# Patient Record
Sex: MALE | Race: WHITE | Employment: FULL TIME | URBAN - NONMETROPOLITAN AREA
[De-identification: names, ages, dates, MRNs, and addresses within clinical notes are randomized per-mention and may not be internally consistent; named-entity substitution may affect disease eponyms.]

---

## 2017-11-09 ENCOUNTER — OFFICE VISIT (OUTPATIENT)
Dept: URGENT CARE | Age: 32
End: 2017-11-09
Payer: COMMERCIAL

## 2017-11-09 VITALS
RESPIRATION RATE: 20 BRPM | HEART RATE: 92 BPM | HEIGHT: 70 IN | OXYGEN SATURATION: 95 % | BODY MASS INDEX: 34.79 KG/M2 | TEMPERATURE: 98.9 F | DIASTOLIC BLOOD PRESSURE: 90 MMHG | WEIGHT: 243 LBS | SYSTOLIC BLOOD PRESSURE: 147 MMHG

## 2017-11-09 DIAGNOSIS — J02.9 SORE THROAT: ICD-10-CM

## 2017-11-09 DIAGNOSIS — J03.90 TONSILLITIS: Primary | ICD-10-CM

## 2017-11-09 DIAGNOSIS — M54.2 ACUTE NECK PAIN: ICD-10-CM

## 2017-11-09 LAB — S PYO AG THROAT QL: NORMAL

## 2017-11-09 PROCEDURE — G8427 DOCREV CUR MEDS BY ELIG CLIN: HCPCS | Performed by: NURSE PRACTITIONER

## 2017-11-09 PROCEDURE — 1036F TOBACCO NON-USER: CPT | Performed by: NURSE PRACTITIONER

## 2017-11-09 PROCEDURE — G8484 FLU IMMUNIZE NO ADMIN: HCPCS | Performed by: NURSE PRACTITIONER

## 2017-11-09 PROCEDURE — 87880 STREP A ASSAY W/OPTIC: CPT | Performed by: NURSE PRACTITIONER

## 2017-11-09 PROCEDURE — 99213 OFFICE O/P EST LOW 20 MIN: CPT | Performed by: NURSE PRACTITIONER

## 2017-11-09 PROCEDURE — G8417 CALC BMI ABV UP PARAM F/U: HCPCS | Performed by: NURSE PRACTITIONER

## 2017-11-09 RX ORDER — METHYLPREDNISOLONE 4 MG/1
TABLET ORAL
Qty: 1 KIT | Refills: 0 | Status: SHIPPED | OUTPATIENT
Start: 2017-11-09 | End: 2017-11-15

## 2017-11-09 RX ORDER — AMOXICILLIN AND CLAVULANATE POTASSIUM 875; 125 MG/1; MG/1
1 TABLET, FILM COATED ORAL EVERY 12 HOURS
Qty: 20 TABLET | Refills: 0 | Status: SHIPPED | OUTPATIENT
Start: 2017-11-09 | End: 2017-11-19

## 2017-11-09 ASSESSMENT — ENCOUNTER SYMPTOMS: SORE THROAT: 1

## 2017-11-09 NOTE — PATIENT INSTRUCTIONS
Patient Education        Tonsillitis: Care Instructions  Your Care Instructions    Tonsillitis is an infection of the tonsils that is caused by bacteria or a virus. The tonsils are in the back of the throat and are part of the immune system. Tonsillitis typically lasts from a few days up to a couple of weeks. Tonsillitis caused by a virus goes away on its own. Tonsillitis caused by the bacteria that causes strep throat is treated with antibiotics. You and your doctor may consider surgery to remove the tonsils (tonsillectomy) if you have serious complications or repeat infections. Follow-up care is a key part of your treatment and safety. Be sure to make and go to all appointments, and call your doctor if you are having problems. It's also a good idea to know your test results and keep a list of the medicines you take. How can you care for yourself at home? · If your doctor prescribed antibiotics, take them as directed. Do not stop taking them just because you feel better. You need to take the full course of antibiotics. · Gargle with warm salt water. This helps reduce swelling and relieve discomfort. Gargle once an hour with 1 teaspoon of salt mixed in 8 fluid ounces of warm water. · Take an over-the-counter pain medicine, such as acetaminophen (Tylenol), ibuprofen (Advil, Motrin), or naproxen (Aleve). Be safe with medicines. Read and follow all instructions on the label. No one younger than 20 should take aspirin. It has been linked to Reye syndrome, a serious illness. · Be careful when taking over-the-counter cold or flu medicines and Tylenol at the same time. Many of these medicines have acetaminophen, which is Tylenol. Read the labels to make sure that you are not taking more than the recommended dose. Too much acetaminophen (Tylenol) can be harmful. · Try an over-the-counter throat spray to relieve throat pain. · Drink plenty of fluids. Fluids may help soothe an irritated throat.  Drink warm or cool

## 2017-11-09 NOTE — PROGRESS NOTES
[address] [facility]  [address] [facility] [place] [address]  Dept: [phone]  Loc: [phone]      Lennie Rees is c/o of Headache; Pharyngitis; Generalized Body Aches; and Anorexia        HPI:     HPI  Patient presents with Headache; Pharyngitis; Generalized Body Aches; and decreased appetite. He did have fever last night of 102. Symptoms started two days ago. He has taken ibuprofen and tylenol. Past Medical History:   Diagnosis Date    Tourette disease       Past Surgical History:   Procedure Laterality Date    CHOLECYSTECTOMY      HERNIA REPAIR      LYMPHADENECTOMY         History reviewed. No pertinent family history. Social History   Substance Use Topics    Smoking status: Never Smoker    Smokeless tobacco: Never Used    Alcohol use 0.0 oz/week      Comment: occasionally      Current Outpatient Prescriptions   Medication Sig Dispense Refill    amoxicillin-clavulanate (AUGMENTIN) 875-125 MG per tablet Take 1 tablet by mouth every 12 hours for 10 days 20 tablet 0    methylPREDNISolone (MEDROL DOSEPACK) 4 MG tablet Take by mouth. 1 kit 0    benzocaine (CEPACOL) 10 MG LOZG Take 1 lozenge by mouth as needed (sore throat) 30 lozenge 0     No current facility-administered medications for this visit. No Known Allergies    Health Maintenance   Topic Date Due    HIV screen  07/12/1999    DTaP/Tdap/Td vaccine (1 - Tdap) 07/12/2003    Flu vaccine (1) 09/01/2017       Subjective:      Review of Systems   Constitutional: Positive for appetite change, chills and fever. HENT: Positive for sore throat. Neurological: Positive for headaches. All other systems reviewed and are negative. Objective:     Physical Exam   Constitutional: He is oriented to person, place, and time. He appears well-developed and well-nourished. No distress. HENT:   Head: Normocephalic and atraumatic.    Right Ear: External ear normal.   Left Ear: External ear normal.   Mouth/Throat: Posterior oropharyngeal erythema present. Right tonsillar swelling and erythema. Eyes: Conjunctivae and EOM are normal. Pupils are equal, round, and reactive to light. Right eye exhibits no discharge. Left eye exhibits no discharge. No scleral icterus. Neck: Normal range of motion. Neck supple. No JVD present. No thyromegaly present. Cardiovascular: Normal rate, regular rhythm and normal heart sounds. No murmur heard. Pulmonary/Chest: Effort normal and breath sounds normal. No respiratory distress. Abdominal: Soft. Bowel sounds are normal. He exhibits no distension. There is no tenderness. Musculoskeletal: Normal range of motion. Neurological: He is alert and oriented to person, place, and time. No cranial nerve deficit. Skin: Skin is warm and dry. No rash noted. He is not diaphoretic. Psychiatric: He has a normal mood and affect. His behavior is normal. Judgment normal.   Nursing note and vitals reviewed. BP (!) 147/90 (Site: Left Arm, Position: Sitting, Cuff Size: Small Adult)   Pulse 92   Temp 98.9 °F (37.2 °C) (Oral)   Resp 20   Ht 5' 10\" (1.778 m)   Wt 243 lb (110.2 kg)   SpO2 95%   BMI 34.87 kg/m²     Results for orders placed or performed in visit on 11/09/17   POCT rapid strep A   Result Value Ref Range    Strep A Ag None Detected None Detected       Assessment/ Plan      1. Tonsillitis  amoxicillin-clavulanate (AUGMENTIN) 875-125 MG per tablet    methylPREDNISolone (MEDROL DOSEPACK) 4 MG tablet    benzocaine (CEPACOL) 10 MG LOZG   2. Sore throat  POCT rapid strep A   3. Acute neck pain  methylPREDNISolone (MEDROL DOSEPACK) 4 MG tablet           Patient given educational materials - see patient instructions. Discussed use, benefit, and side effects of prescribed medications. All patient questions answered. Pt voiced understanding. Patient agreed with treatment plan.  Follow up as needed    Patient Instructions     Patient Education

## 2018-06-13 ENCOUNTER — OFFICE VISIT (OUTPATIENT)
Dept: SURGERY | Age: 33
End: 2018-06-13
Payer: COMMERCIAL

## 2018-06-13 VITALS
WEIGHT: 247 LBS | TEMPERATURE: 97.8 F | HEIGHT: 71 IN | DIASTOLIC BLOOD PRESSURE: 78 MMHG | SYSTOLIC BLOOD PRESSURE: 140 MMHG | BODY MASS INDEX: 34.58 KG/M2

## 2018-06-13 DIAGNOSIS — K42.9 RECURRENT UMBILICAL HERNIA: Primary | ICD-10-CM

## 2018-06-13 PROCEDURE — 1036F TOBACCO NON-USER: CPT | Performed by: PHYSICIAN ASSISTANT

## 2018-06-13 PROCEDURE — G8427 DOCREV CUR MEDS BY ELIG CLIN: HCPCS | Performed by: PHYSICIAN ASSISTANT

## 2018-06-13 PROCEDURE — 99203 OFFICE O/P NEW LOW 30 MIN: CPT | Performed by: PHYSICIAN ASSISTANT

## 2018-06-13 PROCEDURE — G8417 CALC BMI ABV UP PARAM F/U: HCPCS | Performed by: PHYSICIAN ASSISTANT

## 2018-06-13 RX ORDER — AMLODIPINE BESYLATE 5 MG/1
TABLET ORAL
Refills: 0 | COMMUNITY
Start: 2018-06-11

## 2018-06-13 RX ORDER — DICLOFENAC SODIUM 75 MG/1
TABLET, DELAYED RELEASE ORAL
Refills: 2 | COMMUNITY
Start: 2018-05-20

## 2018-06-15 ENCOUNTER — HOSPITAL ENCOUNTER (OUTPATIENT)
Dept: PREADMISSION TESTING | Age: 33
Discharge: HOME | End: 2018-06-19
Payer: COMMERCIAL

## 2018-06-15 VITALS — WEIGHT: 247 LBS | HEIGHT: 71 IN | BODY MASS INDEX: 34.58 KG/M2

## 2018-06-15 LAB
ANION GAP SERPL CALCULATED.3IONS-SCNC: 17 MMOL/L (ref 7–19)
BASOPHILS ABSOLUTE: 0 K/UL (ref 0–0.2)
BASOPHILS RELATIVE PERCENT: 0.4 % (ref 0–1)
BUN BLDV-MCNC: 12 MG/DL (ref 6–20)
CALCIUM SERPL-MCNC: 9.6 MG/DL (ref 8.6–10)
CHLORIDE BLD-SCNC: 99 MMOL/L (ref 98–111)
CO2: 26 MMOL/L (ref 22–29)
CREAT SERPL-MCNC: 0.9 MG/DL (ref 0.5–1.2)
EOSINOPHILS ABSOLUTE: 0.1 K/UL (ref 0–0.6)
EOSINOPHILS RELATIVE PERCENT: 1.5 % (ref 0–5)
GFR NON-AFRICAN AMERICAN: >60
GLUCOSE BLD-MCNC: 132 MG/DL (ref 74–109)
HCT VFR BLD CALC: 43.8 % (ref 42–52)
HEMOGLOBIN: 15.2 G/DL (ref 14–18)
LYMPHOCYTES ABSOLUTE: 3.1 K/UL (ref 1.1–4.5)
LYMPHOCYTES RELATIVE PERCENT: 45.9 % (ref 20–40)
MCH RBC QN AUTO: 29.7 PG (ref 27–31)
MCHC RBC AUTO-ENTMCNC: 34.7 G/DL (ref 33–37)
MCV RBC AUTO: 85.7 FL (ref 80–94)
MONOCYTES ABSOLUTE: 0.6 K/UL (ref 0–0.9)
MONOCYTES RELATIVE PERCENT: 8.5 % (ref 0–10)
NEUTROPHILS ABSOLUTE: 2.9 K/UL (ref 1.5–7.5)
NEUTROPHILS RELATIVE PERCENT: 43.6 % (ref 50–65)
PDW BLD-RTO: 13 % (ref 11.5–14.5)
PLATELET # BLD: 267 K/UL (ref 130–400)
PMV BLD AUTO: 10.3 FL (ref 9.4–12.4)
POTASSIUM SERPL-SCNC: 3.5 MMOL/L (ref 3.5–5)
RBC # BLD: 5.11 M/UL (ref 4.7–6.1)
SODIUM BLD-SCNC: 142 MMOL/L (ref 136–145)
WBC # BLD: 6.7 K/UL (ref 4.8–10.8)

## 2018-06-15 PROCEDURE — 80048 BASIC METABOLIC PNL TOTAL CA: CPT

## 2018-06-15 PROCEDURE — 85025 COMPLETE CBC W/AUTO DIFF WBC: CPT

## 2018-06-15 PROCEDURE — 87070 CULTURE OTHR SPECIMN AEROBIC: CPT

## 2018-06-15 PROCEDURE — 93005 ELECTROCARDIOGRAM TRACING: CPT

## 2018-06-16 LAB — MRSA CULTURE ONLY: NORMAL

## 2018-06-18 LAB
EKG P AXIS: 15 DEGREES
EKG P-R INTERVAL: 162 MS
EKG Q-T INTERVAL: 358 MS
EKG QRS DURATION: 94 MS
EKG QTC CALCULATION (BAZETT): 401 MS
EKG T AXIS: 6 DEGREES

## 2018-06-19 ENCOUNTER — PREP FOR PROCEDURE (OUTPATIENT)
Dept: SURGERY | Age: 33
End: 2018-06-19

## 2018-06-19 RX ORDER — SODIUM CHLORIDE, SODIUM LACTATE, POTASSIUM CHLORIDE, CALCIUM CHLORIDE 600; 310; 30; 20 MG/100ML; MG/100ML; MG/100ML; MG/100ML
INJECTION, SOLUTION INTRAVENOUS CONTINUOUS
Status: CANCELLED | OUTPATIENT
Start: 2018-06-19

## 2018-06-19 RX ORDER — SODIUM CHLORIDE 0.9 % (FLUSH) 0.9 %
10 SYRINGE (ML) INJECTION EVERY 12 HOURS SCHEDULED
Status: CANCELLED | OUTPATIENT
Start: 2018-06-19

## 2018-06-19 RX ORDER — SODIUM CHLORIDE 0.9 % (FLUSH) 0.9 %
10 SYRINGE (ML) INJECTION PRN
Status: CANCELLED | OUTPATIENT
Start: 2018-06-19

## 2018-06-20 ENCOUNTER — ANESTHESIA (OUTPATIENT)
Dept: OPERATING ROOM | Age: 33
End: 2018-06-20
Payer: COMMERCIAL

## 2018-06-20 ENCOUNTER — ANESTHESIA EVENT (OUTPATIENT)
Dept: OPERATING ROOM | Age: 33
End: 2018-06-20
Payer: COMMERCIAL

## 2018-06-20 ENCOUNTER — HOSPITAL ENCOUNTER (OUTPATIENT)
Age: 33
Setting detail: OUTPATIENT SURGERY
Discharge: HOME | End: 2018-06-20
Attending: SURGERY | Admitting: SURGERY
Payer: COMMERCIAL

## 2018-06-20 VITALS
HEIGHT: 71 IN | OXYGEN SATURATION: 94 % | HEART RATE: 89 BPM | WEIGHT: 247 LBS | BODY MASS INDEX: 34.58 KG/M2 | TEMPERATURE: 98 F | SYSTOLIC BLOOD PRESSURE: 148 MMHG | RESPIRATION RATE: 20 BRPM | DIASTOLIC BLOOD PRESSURE: 96 MMHG

## 2018-06-20 VITALS
TEMPERATURE: 98 F | RESPIRATION RATE: 11 BRPM | DIASTOLIC BLOOD PRESSURE: 73 MMHG | SYSTOLIC BLOOD PRESSURE: 113 MMHG | OXYGEN SATURATION: 90 %

## 2018-06-20 DIAGNOSIS — K42.9 RECURRENT UMBILICAL HERNIA: Primary | ICD-10-CM

## 2018-06-20 PROCEDURE — 7100000000 HC PACU RECOVERY - FIRST 15 MIN: Performed by: SURGERY

## 2018-06-20 PROCEDURE — C1781 MESH (IMPLANTABLE): HCPCS | Performed by: SURGERY

## 2018-06-20 PROCEDURE — 6370000000 HC RX 637 (ALT 250 FOR IP): Performed by: SURGERY

## 2018-06-20 PROCEDURE — 2580000003 HC RX 258: Performed by: ANESTHESIOLOGY

## 2018-06-20 PROCEDURE — 6360000002 HC RX W HCPCS: Performed by: NURSE ANESTHETIST, CERTIFIED REGISTERED

## 2018-06-20 PROCEDURE — 7100000011 HC PHASE II RECOVERY - ADDTL 15 MIN: Performed by: SURGERY

## 2018-06-20 PROCEDURE — 49585 REPAIR UMBILICAL HERN,5+Y/O,REDUC: CPT | Performed by: SURGERY

## 2018-06-20 PROCEDURE — 2500000003 HC RX 250 WO HCPCS: Performed by: NURSE ANESTHETIST, CERTIFIED REGISTERED

## 2018-06-20 PROCEDURE — 3600000014 HC SURGERY LEVEL 4 ADDTL 15MIN: Performed by: SURGERY

## 2018-06-20 PROCEDURE — 2580000003 HC RX 258: Performed by: SURGERY

## 2018-06-20 PROCEDURE — 6360000002 HC RX W HCPCS: Performed by: SURGERY

## 2018-06-20 PROCEDURE — 7100000001 HC PACU RECOVERY - ADDTL 15 MIN: Performed by: SURGERY

## 2018-06-20 PROCEDURE — 3600000004 HC SURGERY LEVEL 4 BASE: Performed by: SURGERY

## 2018-06-20 PROCEDURE — 2500000003 HC RX 250 WO HCPCS: Performed by: SURGERY

## 2018-06-20 PROCEDURE — 3700000001 HC ADD 15 MINUTES (ANESTHESIA): Performed by: SURGERY

## 2018-06-20 PROCEDURE — 7100000010 HC PHASE II RECOVERY - FIRST 15 MIN: Performed by: SURGERY

## 2018-06-20 PROCEDURE — 3700000000 HC ANESTHESIA ATTENDED CARE: Performed by: SURGERY

## 2018-06-20 PROCEDURE — 6360000002 HC RX W HCPCS: Performed by: ANESTHESIOLOGY

## 2018-06-20 DEVICE — MESH SURG W4.3XL4.3CM CIR VENTRAL PTCH FOR TISS SEPARATING: Type: IMPLANTABLE DEVICE | Site: UMBILICAL | Status: FUNCTIONAL

## 2018-06-20 RX ORDER — HYDROMORPHONE HCL IN 0.9% NACL 0.5 MG/ML
0.5 SYRINGE (ML) INTRAVENOUS EVERY 5 MIN PRN
Status: DISCONTINUED | OUTPATIENT
Start: 2018-06-20 | End: 2018-06-20 | Stop reason: HOSPADM

## 2018-06-20 RX ORDER — MIDAZOLAM HYDROCHLORIDE 1 MG/ML
2 INJECTION INTRAMUSCULAR; INTRAVENOUS
Status: COMPLETED | OUTPATIENT
Start: 2018-06-20 | End: 2018-06-20

## 2018-06-20 RX ORDER — HYDRALAZINE HYDROCHLORIDE 20 MG/ML
5 INJECTION INTRAMUSCULAR; INTRAVENOUS EVERY 10 MIN PRN
Status: DISCONTINUED | OUTPATIENT
Start: 2018-06-20 | End: 2018-06-20 | Stop reason: HOSPADM

## 2018-06-20 RX ORDER — LIDOCAINE HYDROCHLORIDE 10 MG/ML
1 INJECTION, SOLUTION EPIDURAL; INFILTRATION; INTRACAUDAL; PERINEURAL
Status: DISCONTINUED | OUTPATIENT
Start: 2018-06-20 | End: 2018-06-20 | Stop reason: HOSPADM

## 2018-06-20 RX ORDER — FENTANYL CITRATE 50 UG/ML
INJECTION, SOLUTION INTRAMUSCULAR; INTRAVENOUS PRN
Status: DISCONTINUED | OUTPATIENT
Start: 2018-06-20 | End: 2018-06-20 | Stop reason: SDUPTHER

## 2018-06-20 RX ORDER — DIPHENHYDRAMINE HYDROCHLORIDE 50 MG/ML
12.5 INJECTION INTRAMUSCULAR; INTRAVENOUS
Status: DISCONTINUED | OUTPATIENT
Start: 2018-06-20 | End: 2018-06-20 | Stop reason: HOSPADM

## 2018-06-20 RX ORDER — KETOROLAC TROMETHAMINE 30 MG/ML
INJECTION, SOLUTION INTRAMUSCULAR; INTRAVENOUS PRN
Status: DISCONTINUED | OUTPATIENT
Start: 2018-06-20 | End: 2018-06-20 | Stop reason: SDUPTHER

## 2018-06-20 RX ORDER — ROCURONIUM BROMIDE 10 MG/ML
INJECTION, SOLUTION INTRAVENOUS PRN
Status: DISCONTINUED | OUTPATIENT
Start: 2018-06-20 | End: 2018-06-20 | Stop reason: SDUPTHER

## 2018-06-20 RX ORDER — LIDOCAINE HYDROCHLORIDE 10 MG/ML
INJECTION, SOLUTION INFILTRATION; PERINEURAL PRN
Status: DISCONTINUED | OUTPATIENT
Start: 2018-06-20 | End: 2018-06-20 | Stop reason: SDUPTHER

## 2018-06-20 RX ORDER — SODIUM CHLORIDE 0.9 % (FLUSH) 0.9 %
10 SYRINGE (ML) INJECTION PRN
Status: DISCONTINUED | OUTPATIENT
Start: 2018-06-20 | End: 2018-06-20 | Stop reason: HOSPADM

## 2018-06-20 RX ORDER — METOCLOPRAMIDE HYDROCHLORIDE 5 MG/ML
10 INJECTION INTRAMUSCULAR; INTRAVENOUS
Status: COMPLETED | OUTPATIENT
Start: 2018-06-20 | End: 2018-06-20

## 2018-06-20 RX ORDER — ENALAPRILAT 2.5 MG/2ML
1.25 INJECTION INTRAVENOUS
Status: DISCONTINUED | OUTPATIENT
Start: 2018-06-20 | End: 2018-06-20 | Stop reason: HOSPADM

## 2018-06-20 RX ORDER — OXYCODONE HYDROCHLORIDE AND ACETAMINOPHEN 5; 325 MG/1; MG/1
1-2 TABLET ORAL EVERY 4 HOURS PRN
Qty: 20 TABLET | Refills: 0 | Status: SHIPPED | OUTPATIENT
Start: 2018-06-20 | End: 2018-07-05 | Stop reason: ALTCHOICE

## 2018-06-20 RX ORDER — MEPERIDINE HYDROCHLORIDE 50 MG/ML
12.5 INJECTION INTRAMUSCULAR; INTRAVENOUS; SUBCUTANEOUS EVERY 5 MIN PRN
Status: DISCONTINUED | OUTPATIENT
Start: 2018-06-20 | End: 2018-06-20 | Stop reason: HOSPADM

## 2018-06-20 RX ORDER — KETAMINE HYDROCHLORIDE 100 MG/ML
INJECTION, SOLUTION INTRAMUSCULAR; INTRAVENOUS PRN
Status: DISCONTINUED | OUTPATIENT
Start: 2018-06-20 | End: 2018-06-20 | Stop reason: SDUPTHER

## 2018-06-20 RX ORDER — SODIUM CHLORIDE 9 MG/ML
INJECTION, SOLUTION INTRAVENOUS CONTINUOUS
Status: DISCONTINUED | OUTPATIENT
Start: 2018-06-20 | End: 2018-06-20 | Stop reason: HOSPADM

## 2018-06-20 RX ORDER — SODIUM CHLORIDE 0.9 % (FLUSH) 0.9 %
10 SYRINGE (ML) INJECTION EVERY 12 HOURS SCHEDULED
Status: DISCONTINUED | OUTPATIENT
Start: 2018-06-20 | End: 2018-06-20 | Stop reason: HOSPADM

## 2018-06-20 RX ORDER — FENTANYL CITRATE 50 UG/ML
50 INJECTION, SOLUTION INTRAMUSCULAR; INTRAVENOUS
Status: DISCONTINUED | OUTPATIENT
Start: 2018-06-20 | End: 2018-06-20 | Stop reason: HOSPADM

## 2018-06-20 RX ORDER — PROMETHAZINE HYDROCHLORIDE 25 MG/ML
6.25 INJECTION, SOLUTION INTRAMUSCULAR; INTRAVENOUS
Status: COMPLETED | OUTPATIENT
Start: 2018-06-20 | End: 2018-06-20

## 2018-06-20 RX ORDER — FENTANYL CITRATE 50 UG/ML
25 INJECTION, SOLUTION INTRAMUSCULAR; INTRAVENOUS
Status: DISCONTINUED | OUTPATIENT
Start: 2018-06-20 | End: 2018-06-20 | Stop reason: HOSPADM

## 2018-06-20 RX ORDER — MORPHINE SULFATE 1 MG/ML
2 INJECTION, SOLUTION EPIDURAL; INTRATHECAL; INTRAVENOUS EVERY 5 MIN PRN
Status: DISCONTINUED | OUTPATIENT
Start: 2018-06-20 | End: 2018-06-20 | Stop reason: HOSPADM

## 2018-06-20 RX ORDER — ONDANSETRON 2 MG/ML
INJECTION INTRAMUSCULAR; INTRAVENOUS PRN
Status: DISCONTINUED | OUTPATIENT
Start: 2018-06-20 | End: 2018-06-20 | Stop reason: SDUPTHER

## 2018-06-20 RX ORDER — PROPOFOL 10 MG/ML
INJECTION, EMULSION INTRAVENOUS PRN
Status: DISCONTINUED | OUTPATIENT
Start: 2018-06-20 | End: 2018-06-20 | Stop reason: SDUPTHER

## 2018-06-20 RX ORDER — OXYCODONE HYDROCHLORIDE AND ACETAMINOPHEN 5; 325 MG/1; MG/1
1 TABLET ORAL PRN
Status: COMPLETED | OUTPATIENT
Start: 2018-06-20 | End: 2018-06-20

## 2018-06-20 RX ORDER — MORPHINE SULFATE 1 MG/ML
4 INJECTION, SOLUTION EPIDURAL; INTRATHECAL; INTRAVENOUS EVERY 5 MIN PRN
Status: DISCONTINUED | OUTPATIENT
Start: 2018-06-20 | End: 2018-06-20 | Stop reason: HOSPADM

## 2018-06-20 RX ORDER — SODIUM CHLORIDE, SODIUM LACTATE, POTASSIUM CHLORIDE, CALCIUM CHLORIDE 600; 310; 30; 20 MG/100ML; MG/100ML; MG/100ML; MG/100ML
INJECTION, SOLUTION INTRAVENOUS CONTINUOUS
Status: DISCONTINUED | OUTPATIENT
Start: 2018-06-20 | End: 2018-06-20 | Stop reason: HOSPADM

## 2018-06-20 RX ORDER — HYDROMORPHONE HCL IN 0.9% NACL 0.5 MG/ML
0.25 SYRINGE (ML) INTRAVENOUS EVERY 5 MIN PRN
Status: DISCONTINUED | OUTPATIENT
Start: 2018-06-20 | End: 2018-06-20 | Stop reason: HOSPADM

## 2018-06-20 RX ORDER — BUPIVACAINE HYDROCHLORIDE 2.5 MG/ML
INJECTION, SOLUTION INFILTRATION; PERINEURAL PRN
Status: DISCONTINUED | OUTPATIENT
Start: 2018-06-20 | End: 2018-06-20 | Stop reason: HOSPADM

## 2018-06-20 RX ORDER — LABETALOL HYDROCHLORIDE 5 MG/ML
5 INJECTION, SOLUTION INTRAVENOUS EVERY 10 MIN PRN
Status: DISCONTINUED | OUTPATIENT
Start: 2018-06-20 | End: 2018-06-20 | Stop reason: HOSPADM

## 2018-06-20 RX ORDER — OXYCODONE HYDROCHLORIDE AND ACETAMINOPHEN 5; 325 MG/1; MG/1
2 TABLET ORAL PRN
Status: COMPLETED | OUTPATIENT
Start: 2018-06-20 | End: 2018-06-20

## 2018-06-20 RX ADMIN — FENTANYL CITRATE 100 MCG: 50 INJECTION, SOLUTION INTRAMUSCULAR; INTRAVENOUS at 12:51

## 2018-06-20 RX ADMIN — METOCLOPRAMIDE 10 MG: 5 INJECTION, SOLUTION INTRAMUSCULAR; INTRAVENOUS at 15:18

## 2018-06-20 RX ADMIN — ONDANSETRON HYDROCHLORIDE 4 MG: 2 SOLUTION INTRAMUSCULAR; INTRAVENOUS at 13:20

## 2018-06-20 RX ADMIN — LIDOCAINE HYDROCHLORIDE 50 MG: 10 INJECTION, SOLUTION INFILTRATION; PERINEURAL at 12:51

## 2018-06-20 RX ADMIN — Medication 0.5 MG: at 13:50

## 2018-06-20 RX ADMIN — PROMETHAZINE HYDROCHLORIDE 6.25 MG: 25 INJECTION, SOLUTION INTRAMUSCULAR; INTRAVENOUS at 15:18

## 2018-06-20 RX ADMIN — SODIUM CHLORIDE, SODIUM LACTATE, POTASSIUM CHLORIDE, AND CALCIUM CHLORIDE: 600; 310; 30; 20 INJECTION, SOLUTION INTRAVENOUS at 12:47

## 2018-06-20 RX ADMIN — Medication 2 G: at 12:56

## 2018-06-20 RX ADMIN — OXYCODONE HYDROCHLORIDE AND ACETAMINOPHEN 2 TABLET: 5; 325 TABLET ORAL at 14:25

## 2018-06-20 RX ADMIN — SODIUM CHLORIDE, SODIUM LACTATE, POTASSIUM CHLORIDE, AND CALCIUM CHLORIDE: 600; 310; 30; 20 INJECTION, SOLUTION INTRAVENOUS at 13:06

## 2018-06-20 RX ADMIN — PROPOFOL 200 MG: 10 INJECTION, EMULSION INTRAVENOUS at 12:51

## 2018-06-20 RX ADMIN — MIDAZOLAM HYDROCHLORIDE 2 MG: 1 INJECTION, SOLUTION INTRAMUSCULAR; INTRAVENOUS at 12:47

## 2018-06-20 RX ADMIN — SODIUM CHLORIDE, SODIUM LACTATE, POTASSIUM CHLORIDE, AND CALCIUM CHLORIDE: 600; 310; 30; 20 INJECTION, SOLUTION INTRAVENOUS at 10:38

## 2018-06-20 RX ADMIN — SUGAMMADEX 240 MG: 100 INJECTION, SOLUTION INTRAVENOUS at 13:27

## 2018-06-20 RX ADMIN — KETOROLAC TROMETHAMINE 30 MG: 30 INJECTION, SOLUTION INTRAMUSCULAR at 13:21

## 2018-06-20 RX ADMIN — Medication 50 MG: at 12:51

## 2018-06-20 RX ADMIN — ROCURONIUM BROMIDE 50 MG: 10 INJECTION INTRAVENOUS at 12:51

## 2018-06-20 RX ADMIN — Medication 0.5 MG: at 14:07

## 2018-06-20 ASSESSMENT — PAIN DESCRIPTION - LOCATION
LOCATION: ABDOMEN
LOCATION: ABDOMEN

## 2018-06-20 ASSESSMENT — PAIN DESCRIPTION - FREQUENCY
FREQUENCY: INTERMITTENT
FREQUENCY: CONTINUOUS

## 2018-06-20 ASSESSMENT — PAIN DESCRIPTION - PROGRESSION
CLINICAL_PROGRESSION: NOT CHANGED
CLINICAL_PROGRESSION: NOT CHANGED

## 2018-06-20 ASSESSMENT — PAIN DESCRIPTION - PAIN TYPE
TYPE: SURGICAL PAIN
TYPE: SURGICAL PAIN

## 2018-06-20 ASSESSMENT — PAIN SCALES - GENERAL
PAINLEVEL_OUTOF10: 8
PAINLEVEL_OUTOF10: 6
PAINLEVEL_OUTOF10: 6
PAINLEVEL_OUTOF10: 4
PAINLEVEL_OUTOF10: 8

## 2018-06-20 ASSESSMENT — PAIN DESCRIPTION - ONSET
ONSET: AWAKENED FROM SLEEP
ONSET: AWAKENED FROM SLEEP

## 2018-06-20 ASSESSMENT — PAIN DESCRIPTION - DESCRIPTORS
DESCRIPTORS: ACHING
DESCRIPTORS: ACHING

## 2018-06-20 ASSESSMENT — PAIN - FUNCTIONAL ASSESSMENT: PAIN_FUNCTIONAL_ASSESSMENT: 0-10

## 2018-07-05 ENCOUNTER — OFFICE VISIT (OUTPATIENT)
Dept: SURGERY | Age: 33
End: 2018-07-05

## 2018-07-05 VITALS
SYSTOLIC BLOOD PRESSURE: 130 MMHG | HEIGHT: 70 IN | BODY MASS INDEX: 35.82 KG/M2 | TEMPERATURE: 97.9 F | WEIGHT: 250.2 LBS | DIASTOLIC BLOOD PRESSURE: 80 MMHG

## 2018-07-05 DIAGNOSIS — Z09 S/P UMBILICAL HERNIA REPAIR, FOLLOW-UP EXAM: Primary | ICD-10-CM

## 2018-07-05 PROCEDURE — 99024 POSTOP FOLLOW-UP VISIT: CPT | Performed by: SURGERY

## 2018-08-07 ENCOUNTER — OFFICE VISIT (OUTPATIENT)
Dept: SURGERY | Age: 33
End: 2018-08-07

## 2018-08-07 VITALS
SYSTOLIC BLOOD PRESSURE: 130 MMHG | TEMPERATURE: 98.1 F | DIASTOLIC BLOOD PRESSURE: 86 MMHG | HEIGHT: 70 IN | BODY MASS INDEX: 36.05 KG/M2 | WEIGHT: 251.8 LBS

## 2018-08-07 DIAGNOSIS — Z87.19 S/P HERNIA SURGERY: Primary | ICD-10-CM

## 2018-08-07 DIAGNOSIS — Z98.890 S/P HERNIA SURGERY: Primary | ICD-10-CM

## 2018-08-07 PROCEDURE — 99024 POSTOP FOLLOW-UP VISIT: CPT | Performed by: PHYSICIAN ASSISTANT

## 2018-10-31 RX ORDER — AMLODIPINE BESYLATE AND BENAZEPRIL HYDROCHLORIDE 5; 10 MG/1; MG/1
1 CAPSULE ORAL DAILY
COMMUNITY
End: 2022-04-15

## 2018-12-20 ENCOUNTER — OFFICE VISIT (OUTPATIENT)
Dept: NEUROLOGY | Facility: CLINIC | Age: 33
End: 2018-12-20

## 2018-12-20 VITALS
RESPIRATION RATE: 18 BRPM | DIASTOLIC BLOOD PRESSURE: 74 MMHG | WEIGHT: 251 LBS | SYSTOLIC BLOOD PRESSURE: 136 MMHG | BODY MASS INDEX: 34 KG/M2 | HEART RATE: 78 BPM | HEIGHT: 72 IN

## 2018-12-20 DIAGNOSIS — F95.2 TOURETTE'S SYNDROME: Primary | ICD-10-CM

## 2018-12-20 DIAGNOSIS — R41.3 MEMORY DIFFICULTY: ICD-10-CM

## 2018-12-20 PROCEDURE — 99204 OFFICE O/P NEW MOD 45 MIN: CPT | Performed by: PSYCHIATRY & NEUROLOGY

## 2018-12-20 RX ORDER — MULTIPLE VITAMINS W/ MINERALS TAB 9MG-400MCG
1 TAB ORAL
COMMUNITY
End: 2022-04-15
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
## 2018-12-27 ENCOUNTER — HOSPITAL ENCOUNTER (OUTPATIENT)
Dept: MRI IMAGING | Facility: HOSPITAL | Age: 33
Discharge: HOME | End: 2018-12-27
Attending: PSYCHIATRY & NEUROLOGY | Admitting: PSYCHIATRY & NEUROLOGY

## 2018-12-27 DIAGNOSIS — F95.2 TOURETTE'S SYNDROME: ICD-10-CM

## 2018-12-27 PROCEDURE — 70551 MRI BRAIN STEM W/O DYE: CPT

## 2019-01-03 ENCOUNTER — HOSPITAL ENCOUNTER (OUTPATIENT)
Dept: SPEECH THERAPY | Facility: HOSPITAL | Age: 34
Setting detail: THERAPIES SERIES
Discharge: HOME | End: 2019-01-03

## 2019-01-03 ENCOUNTER — LAB (OUTPATIENT)
Dept: LAB | Facility: HOSPITAL | Age: 34
End: 2019-01-03
Attending: PSYCHIATRY & NEUROLOGY

## 2019-01-03 DIAGNOSIS — R41.89 OTHER SYMPTOMS AND SIGNS INVOLVING COGNITIVE FUNCTIONS AND AWARENESS: Primary | ICD-10-CM

## 2019-01-03 DIAGNOSIS — F95.2 TOURETTE'S SYNDROME: ICD-10-CM

## 2019-01-03 LAB
ALBUMIN SERPL-MCNC: 4.8 G/DL (ref 3.5–5)
ALBUMIN/GLOB SERPL: 1.5 G/DL (ref 1.1–2.5)
ALP SERPL-CCNC: 62 U/L (ref 24–120)
ALT SERPL W P-5'-P-CCNC: 136 U/L (ref 0–54)
ANION GAP SERPL CALCULATED.3IONS-SCNC: 12 MMOL/L (ref 4–13)
ARTICHOKE IGE QN: 130 MG/DL (ref 0–99)
AST SERPL-CCNC: 73 U/L (ref 7–45)
BASOPHILS # BLD AUTO: 0.03 10*3/MM3 (ref 0–0.2)
BASOPHILS NFR BLD AUTO: 0.6 % (ref 0–2)
BILIRUB SERPL-MCNC: 0.5 MG/DL (ref 0.1–1)
BUN BLD-MCNC: 13 MG/DL (ref 5–21)
BUN/CREAT SERPL: 15.7 (ref 7–25)
CALCIUM SPEC-SCNC: 9.3 MG/DL (ref 8.4–10.4)
CHLORIDE SERPL-SCNC: 101 MMOL/L (ref 98–110)
CHOLEST SERPL-MCNC: 209 MG/DL (ref 130–200)
CK SERPL-CCNC: 56 U/L (ref 0–203)
CO2 SERPL-SCNC: 29 MMOL/L (ref 24–31)
CREAT BLD-MCNC: 0.83 MG/DL (ref 0.5–1.4)
DEPRECATED RDW RBC AUTO: 38.2 FL (ref 40–54)
EOSINOPHIL # BLD AUTO: 0.11 10*3/MM3 (ref 0–0.7)
EOSINOPHIL NFR BLD AUTO: 2 % (ref 0–4)
ERYTHROCYTE [DISTWIDTH] IN BLOOD BY AUTOMATED COUNT: 12.9 % (ref 12–15)
ERYTHROCYTE [SEDIMENTATION RATE] IN BLOOD: <1 MM/HR (ref 0–15)
FOLATE SERPL-MCNC: 12.1 NG/ML
GFR SERPL CREATININE-BSD FRML MDRD: 106 ML/MIN/1.73
GLOBULIN UR ELPH-MCNC: 3.3 GM/DL
GLUCOSE BLD-MCNC: 101 MG/DL (ref 70–100)
HCT VFR BLD AUTO: 42 % (ref 40–52)
HDLC SERPL-MCNC: 34 MG/DL
HGB BLD-MCNC: 14.8 G/DL (ref 14–18)
IMM GRANULOCYTES # BLD AUTO: 0.02 10*3/MM3 (ref 0–0.03)
IMM GRANULOCYTES NFR BLD AUTO: 0.4 % (ref 0–5)
LDLC/HDLC SERPL: 3.64 {RATIO}
LYMPHOCYTES # BLD AUTO: 2.35 10*3/MM3 (ref 0.72–4.86)
LYMPHOCYTES NFR BLD AUTO: 43.2 % (ref 15–45)
MAGNESIUM SERPL-MCNC: 2 MG/DL (ref 1.4–2.2)
MCH RBC QN AUTO: 28.9 PG (ref 28–32)
MCHC RBC AUTO-ENTMCNC: 35.2 G/DL (ref 33–36)
MCV RBC AUTO: 82 FL (ref 82–95)
MONOCYTES # BLD AUTO: 0.49 10*3/MM3 (ref 0.19–1.3)
MONOCYTES NFR BLD AUTO: 9 % (ref 4–12)
NEUTROPHILS # BLD AUTO: 2.44 10*3/MM3 (ref 1.87–8.4)
NEUTROPHILS NFR BLD AUTO: 44.8 % (ref 39–78)
NRBC BLD AUTO-RTO: 0 /100 WBC (ref 0–0)
PLATELET # BLD AUTO: 270 10*3/MM3 (ref 130–400)
PMV BLD AUTO: 9.9 FL (ref 6–12)
POTASSIUM BLD-SCNC: 4.1 MMOL/L (ref 3.5–5.3)
PROT SERPL-MCNC: 8.1 G/DL (ref 6.3–8.7)
RBC # BLD AUTO: 5.12 10*6/MM3 (ref 4.8–5.9)
SODIUM BLD-SCNC: 142 MMOL/L (ref 135–145)
T4 FREE SERPL-MCNC: 1.23 NG/DL (ref 0.78–2.19)
TRIGL SERPL-MCNC: 256 MG/DL (ref 0–149)
VIT B12 BLD-MCNC: 821 PG/ML (ref 239–931)
WBC NRBC COR # BLD: 5.44 10*3/MM3 (ref 4.8–10.8)

## 2019-01-03 PROCEDURE — 82550 ASSAY OF CK (CPK): CPT

## 2019-01-03 PROCEDURE — 83735 ASSAY OF MAGNESIUM: CPT

## 2019-01-03 PROCEDURE — 80061 LIPID PANEL: CPT

## 2019-01-03 PROCEDURE — 85651 RBC SED RATE NONAUTOMATED: CPT

## 2019-01-03 PROCEDURE — 85025 COMPLETE CBC W/AUTO DIFF WBC: CPT

## 2019-01-03 PROCEDURE — 82607 VITAMIN B-12: CPT

## 2019-01-03 PROCEDURE — 36415 COLL VENOUS BLD VENIPUNCTURE: CPT

## 2019-01-03 PROCEDURE — 80053 COMPREHEN METABOLIC PANEL: CPT

## 2019-01-03 PROCEDURE — 84439 ASSAY OF FREE THYROXINE: CPT

## 2019-01-03 PROCEDURE — 82746 ASSAY OF FOLIC ACID SERUM: CPT

## 2019-01-03 PROCEDURE — 96125 COGNITIVE TEST BY HC PRO: CPT | Performed by: SPEECH-LANGUAGE PATHOLOGIST

## 2019-01-03 NOTE — THERAPY DISCHARGE NOTE
Outpatient Speech Language Pathology   Adult Speech Language Cognitive Eval/Discharge  [facility]     Patient Name: Mj Billy  : 1985  MRN: [id number]  Today's Date: 1/3/2019         Visit Date: 2019    There is no problem list on file for this patient.       Past Medical History:   Diagnosis Date   • Tourette syndrome         Past Surgical History:   Procedure Laterality Date   • APPENDECTOMY     • DEEP NECK LYMPH NODE BIOPSY / EXCISION     • GALLBLADDER SURGERY     • HAND SURGERY     • HERNIA REPAIR           Visit Dx:    ICD-10-CM ICD-9-CM   1. Other symptoms and signs involving cognitive functions and awareness R41.89 799.59     Mj Billy was seen today for a memory evaluation. History is significant for Tourette's syndrome and ADHD. Patient complains of forgetfulness and worsening memory since starting his ADHD medication recently. Patient was alert and cooperative for today's assessment and appeared to put forth good effort on all tasks. See below for RBANS scores.     RBANS: The Repeatable Battery for the Assessment of Neuropsychological Status (RBANS) assesses patient function in the areas of Immediate and Delayed memory, visuospatial/constructional skills, language and attention. It is used to detect and track neurocognitive deficits.   Index score Percentile Qualitative Description   Immediate Memory 90 25 Average   Visuospatial 100 50 Average   Language 96 39 Average   Attention 106 66 Average   Delayed Memory 94 34 Average   Total Scale 95 37 Average   Comments:  All scores WNL. No direct therapy warranted at this time.       SLP SLC Evaluation - 19 0823        Communication Assessment/Intervention    Document Type  evaluation   -KG    Subjective Information  no complaints   -KG    Patient Observations  alert;cooperative;agree to therapy   -KG    Patient Effort  good   -KG    Symptoms Noted During/After Treatment  none   -KG       General Information    Patient  Profile Reviewed  yes   -KG    Pertinent History Of Current Problem  Tourette's, ADHD, started new ADHD medication, has noticed worsening memory.    -KG    Precautions/Limitations, Vision  WFL with corrective lenses   -KG    Precautions/Limitations, Hearing  WFL   -KG    Prior Level of Function-Communication  WFL   -KG    Plans/Goals Discussed with  patient   -KG    Barriers to Rehab  none identified   -KG    Patient's Goals for Discharge  return to all previous roles/activities   -KG    Standardized Assessment Used  RBANS   -KG       Pain Assessment    Additional Documentation  Pain Scale: Numbers Pre/Post-Treatment (Group)   -KG       Pain Scale: Numbers Pre/Post-Treatment    Pain Scale: Numbers, Pretreatment  1/10   -KG    Pain Scale: Numbers, Post-Treatment  1/10   -KG    Pain Location - Side  Left   -KG    Pain Location  knee   -KG       Cognitive Assessment Intervention- SLP    Orientation Status (Cognition)  WFL   -KG    Memory (Cognitive)  WFL   -KG    Attention (Cognitive)  WFL   -KG    Right Hemisphere Function  visuo-spatial;WFL   -KG    Cognition, Comment  See report for scores.    -KG       Standardized Tests    Cognitive/Memory Tests  RBANS: Repeatable Battery for the Assessment of Neuropsychological Status   -KG       SLP Clinical Impressions    SLP Diagnosis  Memory WFL   -KG    SLC Criteria for Skilled Therapy Interventions Met  no problems identified which require skilled intervention   -KG    Functional Impact  no impact on function   -KG       Recommendations    Therapy Frequency (SLP SLC)  evaluation only   -KG    Anticipated Dischage Disposition  home   -KG      User Key  (r) = Recorded By, (t) = Taken By, (c) = Cosigned By    Initials Name Provider Type    KG Shannan Hermosillo CCC-SLP Speech and Language Pathologist                        OP SLP Education     Row Name 01/03/19 0901       Education    Barriers to Learning  No barriers identified  -KG    Education Provided  Described results of  evaluation;Patient expressed understanding of evaluation;Patient demonstrated recommended strategies  -KG    Assessed  Learning needs;Learning motivation;Learning preferences;Learning readiness  -KG    Learning Motivation  Strong  -KG    Learning Method  Explanation  -KG    Teaching Response  Verbalized understanding  -KG      User Key  (r) = Recorded By, (t) = Taken By, (c) = Cosigned By    Initials Name Effective Dates    Shannan Werner CCC-SLP 06/08/18 -               OP SLP Assessment/Plan - 01/03/19 0900        SLP Assessment    Functional Problems  Speech Language- Adult/Cognition   -KG    Impact on Function: Adult Speech Language/Cognition  Other (comment0 No impact    No impact  -KG    Clinical Impression: Speech Language-Adult/Congnition  Cognitive Communication WFL   -KG    Clinical Impression Comments  Memory WFL   -KG    SLP Diagnosis  Memory score within functional limits.   -KG    Prognosis  Good (comment)   -KG    Patient would benefit from skilled therapy intervention  No   -KG       SLP Plan    Plan Comments  Follow up with MD. No therapy warranted at this time.    -KG      User Key  (r) = Recorded By, (t) = Taken By, (c) = Cosigned By    Initials Name Provider Type    Shannan Werner CCC-SLP Speech and Language Pathologist                 Time Calculation:   SLP Start Time: 0810  SLP Stop Time: 0910  SLP Time Calculation (min): 60 min    Therapy Charges for Today     Code Description Service Date Service Provider Modifiers Qty    [id number] HC ST STD COG PERF TEST PER HOUR 1/3/2019 Shannan Hermosillo CCC-SLP GN 1               OP SLP Discharge Summary  Date of Discharge: 01/03/19  Reason for Discharge: all goals and outcomes met, no further needs identified  Progress Toward Achieving Short/long Term Goals: discharge on same date as initial evaluation  Discharge Destination: home  Discharge Instructions: Follow up with MD    Thank you for this referral.   Shannan Hermosillo  CCC-SLP  1/3/2019

## 2019-01-10 ENCOUNTER — DOCUMENTATION (OUTPATIENT)
Dept: NEUROLOGY | Facility: CLINIC | Age: 34
End: 2019-01-10

## 2019-01-10 NOTE — PROGRESS NOTES
Spoke with patient in regards to his abnormal blood work.  He is to contact Dr. Cooney to discuss the findings.

## 2019-01-18 ENCOUNTER — HOSPITAL ENCOUNTER (OUTPATIENT)
Dept: NEUROLOGY | Facility: HOSPITAL | Age: 34
Discharge: HOME | End: 2019-01-18
Attending: PSYCHIATRY & NEUROLOGY | Admitting: PSYCHIATRY & NEUROLOGY

## 2019-01-18 DIAGNOSIS — F95.2 TOURETTE'S SYNDROME: ICD-10-CM

## 2019-01-18 PROCEDURE — 95816 EEG AWAKE AND DROWSY: CPT | Performed by: PSYCHIATRY & NEUROLOGY

## 2019-01-18 PROCEDURE — 95816 EEG AWAKE AND DROWSY: CPT

## 2019-02-08 ENCOUNTER — TELEPHONE (OUTPATIENT)
Dept: NEUROLOGY | Facility: CLINIC | Age: 34
End: 2019-02-08

## 2019-02-08 DIAGNOSIS — R90.89 ABNORMAL FINDING ON MRI OF BRAIN: Primary | ICD-10-CM

## 2019-02-08 NOTE — TELEPHONE ENCOUNTER
----- Message from Luke Lambert MD sent at 2/8/2019  1:58 PM CST -----  Contact patient that patient will be getting a gadolinium dye for this.  It is somewhat unusual to have a reaction but patients can have reaction to the dye with warm feeling or itching/rash, effect on the kidneys or even chest pain palpitations heart attack stroke and death.  These latter side effects are unusual but need to be recognized as potential problem.  Patient needs to understand this and decide to proceed.

## 2019-02-08 NOTE — TELEPHONE ENCOUNTER
----- Message from Yael Bruce LPN sent at 2/8/2019  4:16 PM CST -----      ----- Message -----  From: Yael Bruce LPN  Sent: 2/8/2019  12:33 PM  To: MD Mj Monroy said he received your letter and would like to have the MRI with contrast.

## 2019-02-20 DIAGNOSIS — R90.89 ABNORMAL BRAIN MRI: Primary | ICD-10-CM

## 2019-03-13 ENCOUNTER — HOSPITAL ENCOUNTER (OUTPATIENT)
Dept: MRI IMAGING | Facility: HOSPITAL | Age: 34
Discharge: HOME | End: 2019-03-13
Admitting: PSYCHIATRY & NEUROLOGY

## 2019-03-13 DIAGNOSIS — R90.89 ABNORMAL BRAIN MRI: ICD-10-CM

## 2019-03-13 LAB — CREAT BLDA-MCNC: 1 MG/DL (ref 0.6–1.3)

## 2019-03-13 PROCEDURE — 82565 ASSAY OF CREATININE: CPT

## 2019-03-13 PROCEDURE — 0 GADOBENATE DIMEGLUMINE 529 MG/ML SOLUTION: Performed by: PSYCHIATRY & NEUROLOGY

## 2019-03-13 PROCEDURE — 70553 MRI BRAIN STEM W/O & W/DYE: CPT

## 2019-03-13 PROCEDURE — A9577 INJ MULTIHANCE: HCPCS | Performed by: PSYCHIATRY & NEUROLOGY

## 2019-03-13 RX ADMIN — GADOBENATE DIMEGLUMINE 20 ML: 529 INJECTION, SOLUTION INTRAVENOUS at 10:25

## 2020-07-24 PROCEDURE — 87635 SARS-COV-2 COVID-19 AMP PRB: CPT | Performed by: OBSTETRICS & GYNECOLOGY

## 2021-01-06 RX ORDER — AMLODIPINE BESYLATE AND BENAZEPRIL HYDROCHLORIDE 5; 10 MG/1; MG/1
1 CAPSULE ORAL DAILY
Qty: 30 CAPSULE | Refills: 1 | Status: CANCELLED | OUTPATIENT
Start: 2021-01-06

## 2021-01-13 RX ORDER — AMLODIPINE BESYLATE AND BENAZEPRIL HYDROCHLORIDE 5; 10 MG/1; MG/1
1 CAPSULE ORAL DAILY
Qty: 30 CAPSULE | Refills: 1 | Status: SHIPPED | OUTPATIENT
Start: 2021-01-13 | End: 2021-03-29 | Stop reason: SDUPTHER

## 2022-04-15 ENCOUNTER — LAB (OUTPATIENT)
Dept: LAB | Facility: HOSPITAL | Age: 37
End: 2022-04-15

## 2022-04-15 ENCOUNTER — OFFICE VISIT (OUTPATIENT)
Dept: FAMILY MEDICINE CLINIC | Facility: CLINIC | Age: 37
End: 2022-04-15

## 2022-04-15 VITALS
HEIGHT: 71 IN | DIASTOLIC BLOOD PRESSURE: 82 MMHG | BODY MASS INDEX: 37.8 KG/M2 | SYSTOLIC BLOOD PRESSURE: 130 MMHG | WEIGHT: 270 LBS | TEMPERATURE: 97.8 F | RESPIRATION RATE: 20 BRPM

## 2022-04-15 DIAGNOSIS — F95.2 GILLES DE LA TOURETTE'S SYNDROME: ICD-10-CM

## 2022-04-15 DIAGNOSIS — F90.2 ATTENTION DEFICIT HYPERACTIVITY DISORDER (ADHD), COMBINED TYPE: Primary | ICD-10-CM

## 2022-04-15 DIAGNOSIS — F90.2 ATTENTION DEFICIT HYPERACTIVITY DISORDER (ADHD), COMBINED TYPE: ICD-10-CM

## 2022-04-15 DIAGNOSIS — F41.8 MIXED ANXIETY AND DEPRESSIVE DISORDER: ICD-10-CM

## 2022-04-15 DIAGNOSIS — E66.9 CLASS 2 OBESITY WITH BODY MASS INDEX (BMI) OF 37.0 TO 37.9 IN ADULT, UNSPECIFIED OBESITY TYPE, UNSPECIFIED WHETHER SERIOUS COMORBIDITY PRESENT: ICD-10-CM

## 2022-04-15 PROBLEM — E66.812 CLASS 2 OBESITY WITH BODY MASS INDEX (BMI) OF 37.0 TO 37.9 IN ADULT: Status: ACTIVE | Noted: 2022-04-15

## 2022-04-15 PROBLEM — G47.00 INSOMNIA: Status: ACTIVE | Noted: 2018-11-15

## 2022-04-15 PROBLEM — I10 HYPERTENSIVE DISORDER: Status: ACTIVE | Noted: 2018-11-15

## 2022-04-15 PROBLEM — F90.9 ATTENTION DEFICIT HYPERACTIVITY DISORDER: Status: ACTIVE | Noted: 2018-11-15

## 2022-04-15 LAB
AMPHET+METHAMPHET UR QL: NEGATIVE
AMPHETAMINES UR QL: NEGATIVE
BARBITURATES UR QL SCN: NEGATIVE
BENZODIAZ UR QL SCN: NEGATIVE
BUPRENORPHINE SERPL-MCNC: NEGATIVE NG/ML
CANNABINOIDS SERPL QL: NEGATIVE
COCAINE UR QL: NEGATIVE
METHADONE UR QL SCN: NEGATIVE
OPIATES UR QL: NEGATIVE
OXYCODONE UR QL SCN: NEGATIVE
PCP UR QL SCN: NEGATIVE
PROPOXYPH UR QL: NEGATIVE
TRICYCLICS UR QL SCN: NEGATIVE

## 2022-04-15 PROCEDURE — 80306 DRUG TEST PRSMV INSTRMNT: CPT

## 2022-04-15 PROCEDURE — 99204 OFFICE O/P NEW MOD 45 MIN: CPT | Performed by: NURSE PRACTITIONER

## 2022-04-15 NOTE — PROGRESS NOTES
"Chief Complaint  ADD (Wanting to get back on adhd medication. Has been off x 2 years )    Subjective    History of Present Illness      Patient presents to [facility] for   Pt wanting to restart adhd medication. Has been off x 2 years        Review of Systems   Constitutional: Negative.    HENT: Negative.    Eyes: Negative.    Respiratory: Negative.    Cardiovascular: Negative.    Gastrointestinal: Negative.    Endocrine: Negative.    Genitourinary: Negative.    Musculoskeletal: Negative.    Skin: Negative.    Allergic/Immunologic: Negative.    Neurological: Negative.    Hematological: Negative.    Psychiatric/Behavioral: Negative.        I have reviewed and agree with the HPI and ROS information as above.  Sophia Sanchez, APRN     Objective   Vital Signs:   /82   Temp 97.8 °F (36.6 °C)   Resp 20   Ht 180.3 cm (71\")   Wt 122 kg (270 lb)   BMI 37.66 kg/m²           Physical Exam  Constitutional:       Appearance: Normal appearance. He is well-developed. He is obese.   HENT:      Head: Normocephalic and atraumatic.      Right Ear: External ear normal.      Left Ear: External ear normal.      Nose: Nose normal. No nasal tenderness or congestion.      Mouth/Throat:      Lips: Pink. No lesions.      Mouth: Mucous membranes are moist. No oral lesions.      Dentition: Normal dentition.      Pharynx: Oropharynx is clear. No pharyngeal swelling, oropharyngeal exudate or posterior oropharyngeal erythema.   Eyes:      General: Lids are normal. Vision grossly intact. No scleral icterus.        Right eye: No discharge.         Left eye: No discharge.      Extraocular Movements: Extraocular movements intact.      Conjunctiva/sclera: Conjunctivae normal.      Right eye: Right conjunctiva is not injected.      Left eye: Left conjunctiva is not injected.      Pupils: Pupils are equal, round, and reactive to light.   Cardiovascular:      Rate and Rhythm: Normal rate and regular rhythm.     "  Heart sounds: Normal heart sounds. No murmur heard.    No gallop.   Pulmonary:      Effort: Pulmonary effort is normal.      Breath sounds: Normal breath sounds and air entry. No wheezing, rhonchi or rales.   Musculoskeletal:         General: No tenderness or deformity. Normal range of motion.      Cervical back: Full passive range of motion without pain, normal range of motion and neck supple.      Right lower leg: No edema.      Left lower leg: No edema.   Skin:     General: Skin is warm and dry.      Coloration: Skin is not jaundiced.      Findings: No rash.   Neurological:      Mental Status: He is alert and oriented to person, place, and time.      Cranial Nerves: Cranial nerves are intact.      Sensory: Sensation is intact.      Motor: Motor function is intact.      Coordination: Coordination is intact.      Gait: Gait is intact.   Psychiatric:         Attention and Perception: Attention normal.         Mood and Affect: Mood and affect normal.         Behavior: Behavior is not hyperactive. Behavior is cooperative.         Thought Content: Thought content normal.         Judgment: Judgment normal.          MARCOS-7: Over the last two weeks, how often have you been bothered by the following problems?  Feeling nervous, anxious or on edge: Not at all  Not being able to stop or control worrying: Not at all  Worrying too much about different things: Several days  Trouble Relaxing: Nearly every day (has tourettes)  Being so restless that it is hard to sit still: Nearly every day  Becoming easily annoyed or irritable: Several days  Feeling afraid as if something awful might happen: Not at all  MARCOS 7 Total Score: 8  If you checked any problems, how difficult have these problems made it for you to do your work, take care of things at home, or get along with other people: Somewhat difficult    PHQ-2 Depression Screening  Little interest or pleasure in doing things? 0-->not at all   Feeling down, depressed, or hopeless?  0-->not at all   PHQ-2 Total Score 0     PHQ-9 Depression Screening  Little interest or pleasure in doing things? 0-->not at all   Feeling down, depressed, or hopeless? 0-->not at all   Trouble falling or staying asleep, or sleeping too much?     Feeling tired or having little energy?     Poor appetite or overeating?     Feeling bad about yourself - or that you are a failure or have let yourself or your family down?     Trouble concentrating on things, such as reading the newspaper or watching television?     Moving or speaking so slowly that other people could have noticed? Or the opposite - being so fidgety or restless that you have been moving around a lot more than usual?     Thoughts that you would be better off dead, or of hurting yourself in some way?     PHQ-9 Total Score 0   If you checked off any problems, how difficult have these problems made it for you to do your work, take care of things at home, or get along with other people?        Result Review  Data Reviewed:              Assessment and Plan      Problem List Items Addressed This Visit        Endocrine and Metabolic    Class 2 obesity with body mass index (BMI) of 37.0 to 37.9 in adult       Mental Health    Attention deficit hyperactivity disorder - Primary    Relevant Medications    nefazodone (SERZONE) 100 MG tablet    Other Relevant Orders    Urine Drug Screen - Urine, Clean Catch    Morgan de la Tourette's syndrome    Relevant Medications    nefazodone (SERZONE) 100 MG tablet    Mixed anxiety and depressive disorder    Relevant Medications    nefazodone (SERZONE) 100 MG tablet        Patient here today to restart ADHD and anxiety medications.  He has been off of his medications for about 2 years.  He previously was taking Vyvanse, guanfacine, and nefazodone.  He states he is did very well with this past.  He states he is having trouble with focus, completing task, and staying on track.     Plan:    1.  Restart Vyvanse 40 mg pending clean  urine drug screen.  Routine monitoring urine drug screen ordered for patient to complete today.  Francisco is clean.  2.  Restart nefazodone 100 mg twice daily.  Instructed to take daily x1 week then increase to twice daily dosing.  3.  Follow-up 1 month.      Follow Up   Return in about 1 month (around 5/15/2022) for Recheck.  Patient was given instructions and counseling regarding his condition or for health maintenance advice. Please see specific information pulled into the AVS if appropriate.     ADHD Follow up:    Francisco report initiated in office and is clean. ADRS (Adult ADHD Assessment Form) reviewed in detail, scanned in chart, and has been reviewed at time of appointment.  All questions, including medication and side effects, were discussed in detail at time of patient's visit. Patient will begin new medication which was discussed at today's visit. Patient is to return in 1 month(s).

## 2022-05-17 ENCOUNTER — OFFICE VISIT (OUTPATIENT)
Dept: FAMILY MEDICINE CLINIC | Facility: CLINIC | Age: 37
End: 2022-05-17

## 2022-05-17 VITALS
BODY MASS INDEX: 34.58 KG/M2 | RESPIRATION RATE: 16 BRPM | SYSTOLIC BLOOD PRESSURE: 126 MMHG | HEART RATE: 71 BPM | TEMPERATURE: 97.6 F | WEIGHT: 247 LBS | OXYGEN SATURATION: 99 % | DIASTOLIC BLOOD PRESSURE: 74 MMHG | HEIGHT: 71 IN

## 2022-05-17 DIAGNOSIS — I10 PRIMARY HYPERTENSION: ICD-10-CM

## 2022-05-17 DIAGNOSIS — E66.9 CLASS 1 OBESITY WITH BODY MASS INDEX (BMI) OF 34.0 TO 34.9 IN ADULT, UNSPECIFIED OBESITY TYPE, UNSPECIFIED WHETHER SERIOUS COMORBIDITY PRESENT: ICD-10-CM

## 2022-05-17 DIAGNOSIS — F41.8 MIXED ANXIETY AND DEPRESSIVE DISORDER: ICD-10-CM

## 2022-05-17 DIAGNOSIS — F95.2 GILLES DE LA TOURETTE'S SYNDROME: ICD-10-CM

## 2022-05-17 DIAGNOSIS — F90.2 ATTENTION DEFICIT HYPERACTIVITY DISORDER (ADHD), COMBINED TYPE: Primary | ICD-10-CM

## 2022-05-17 PROBLEM — E66.811 CLASS 1 OBESITY WITH BODY MASS INDEX (BMI) OF 34.0 TO 34.9 IN ADULT: Status: ACTIVE | Noted: 2022-04-15

## 2022-05-17 PROCEDURE — 99214 OFFICE O/P EST MOD 30 MIN: CPT | Performed by: NURSE PRACTITIONER

## 2022-05-17 NOTE — PROGRESS NOTES
"Chief Complaint  ADHD (1 month med check.  He states medication is not working as well as it did the first couple of weeks.  He states he is loosing focus and unable to complete tasks.)    Subjective    History of Present Illness      Patient presents to [facility] for   ADHD/Mood HPI    Visit for:  follow-up. Most recent visit was 1 month ago.  Interim changes to follow up on today: no change in medication  Work/School Performance:  struggling  Cognitive:  unable to focus    Behavior  Hyperactivity: is not hyperactive  Impulsivity: no impulsivity  Tasking: unable to complete tasks    Social  ADHD social/impulsive symptoms:  not impatient    Behavioral health  Behavior: no concerns  Emotional coping: demonstrates feelings of no concerns       Review of Systems   Constitutional: Negative.    HENT: Negative.    Eyes: Negative.    Respiratory: Negative.    Cardiovascular: Negative.    Gastrointestinal: Negative.    Endocrine: Negative.    Genitourinary: Negative.    Musculoskeletal: Negative.    Skin: Negative.    Allergic/Immunologic: Negative.    Neurological: Negative.    Hematological: Negative.    Psychiatric/Behavioral: Negative.        I have reviewed and agree with the HPI and ROS information as above.  Sophia Sanchez, APRN     Objective   Vital Signs:   /74   Pulse 71   Temp 97.6 °F (36.4 °C)   Resp 16   Ht 180.3 cm (71\")   Wt 112 kg (247 lb)   SpO2 99%   BMI 34.45 kg/m²           Physical Exam  Constitutional:       Appearance: Normal appearance. He is well-developed. He is obese.   HENT:      Head: Normocephalic and atraumatic.      Right Ear: External ear normal.      Left Ear: External ear normal.      Nose: Nose normal. No nasal tenderness or congestion.      Mouth/Throat:      Lips: Pink. No lesions.      Mouth: Mucous membranes are moist. No oral lesions.      Dentition: Normal dentition.      Pharynx: Oropharynx is clear. No pharyngeal swelling, " oropharyngeal exudate or posterior oropharyngeal erythema.   Eyes:      General: Lids are normal. Vision grossly intact. No scleral icterus.        Right eye: No discharge.         Left eye: No discharge.      Extraocular Movements: Extraocular movements intact.      Conjunctiva/sclera: Conjunctivae normal.      Right eye: Right conjunctiva is not injected.      Left eye: Left conjunctiva is not injected.      Pupils: Pupils are equal, round, and reactive to light.   Cardiovascular:      Rate and Rhythm: Normal rate and regular rhythm.      Heart sounds: Normal heart sounds. No murmur heard.    No gallop.   Pulmonary:      Effort: Pulmonary effort is normal.      Breath sounds: Normal breath sounds and air entry. No wheezing, rhonchi or rales.   Musculoskeletal:         General: No tenderness or deformity. Normal range of motion.      Cervical back: Full passive range of motion without pain, normal range of motion and neck supple.      Right lower leg: No edema.      Left lower leg: No edema.   Skin:     General: Skin is warm and dry.      Coloration: Skin is not jaundiced.      Findings: No rash.   Neurological:      Mental Status: He is alert and oriented to person, place, and time.      Cranial Nerves: Cranial nerves are intact.      Sensory: Sensation is intact.      Motor: Motor function is intact.      Coordination: Coordination is intact.      Gait: Gait is intact.   Psychiatric:         Attention and Perception: Attention normal.         Mood and Affect: Mood and affect normal.         Behavior: Behavior is not hyperactive. Behavior is cooperative.         Thought Content: Thought content normal.         Judgment: Judgment normal.          MARCOS-7:      PHQ-2 Depression Screening  Little interest or pleasure in doing things?     Feeling down, depressed, or hopeless?     PHQ-2 Total Score       PHQ-9 Depression Screening  Little interest or pleasure in doing things?     Feeling down, depressed, or hopeless?      Trouble falling or staying asleep, or sleeping too much?     Feeling tired or having little energy?     Poor appetite or overeating?     Feeling bad about yourself - or that you are a failure or have let yourself or your family down?     Trouble concentrating on things, such as reading the newspaper or watching television?     Moving or speaking so slowly that other people could have noticed? Or the opposite - being so fidgety or restless that you have been moving around a lot more than usual?     Thoughts that you would be better off dead, or of hurting yourself in some way?     PHQ-9 Total Score     If you checked off any problems, how difficult have these problems made it for you to do your work, take care of things at home, or get along with other people?        Result Review  Data Reviewed:   The following data was reviewed by: DONNA Cote on 05/17/2022:  Urine Drug Screen - Urine, Clean Catch (04/15/2022 09:52)             Assessment and Plan      Problem List Items Addressed This Visit        Cardiac and Vasculature    Hypertensive disorder       Endocrine and Metabolic    Class 1 obesity with body mass index (BMI) of 34.0 to 34.9 in adult       Mental Health    Attention deficit hyperactivity disorder - Primary    Relevant Medications    nefazodone (SERZONE) 100 MG tablet    Morgan de la Tourette's syndrome    Relevant Medications    nefazodone (SERZONE) 100 MG tablet    Mixed anxiety and depressive disorder    Relevant Medications    nefazodone (SERZONE) 100 MG tablet        Pt here today for a 1 month ADHD and anxiety follow-up.  He was started on Vyvanse 40 mg last month.  He feels that the medication worked well for the first couple of weeks, but feels as though it is not as effective as it was initially.  He states there are times when he feels as though he has not taken it.  Denies any worsening tics or issues with Tourette's since starting a stimulant.  Anxiety and depression stable with  nefazodone.  Blood pressure stable.  This is managed by his PCP, Dr. Whittington.    Plan:    1.  Increase Vyvanse to 50 mg.  Francisco is clean.  UDS is up-to-date and appropriate.  2.  Continue nefazodone, refill sent.  3.  Blood pressure management per PCP.  4.  Instructed patient to monitor for worsening tics, elevated blood pressure, or worsening anxiety with increasing her Vyvanse dose.  5.  Follow-up 1 month.      Follow Up   Return in about 1 month (around 6/17/2022) for Recheck.  Patient was given instructions and counseling regarding his condition or for health maintenance advice. Please see specific information pulled into the AVS if appropriate.     ADHD Follow up:    Francisco report initiated in office and is clean. ADRS (Adult ADHD Assessment Form) reviewed in detail, scanned in chart, and has been reviewed at time of appointment.  All questions, including medication and side effects, were discussed in detail at time of patient's visit. Patient will change medication dose which was discussed at today's visit. Patient is to return in 1 month(s).

## 2022-06-17 ENCOUNTER — OFFICE VISIT (OUTPATIENT)
Dept: FAMILY MEDICINE CLINIC | Facility: CLINIC | Age: 37
End: 2022-06-17

## 2022-06-17 VITALS
WEIGHT: 229 LBS | SYSTOLIC BLOOD PRESSURE: 117 MMHG | BODY MASS INDEX: 32.06 KG/M2 | HEART RATE: 70 BPM | HEIGHT: 71 IN | DIASTOLIC BLOOD PRESSURE: 79 MMHG | TEMPERATURE: 98.7 F

## 2022-06-17 DIAGNOSIS — E66.09 CLASS 1 OBESITY DUE TO EXCESS CALORIES WITH SERIOUS COMORBIDITY AND BODY MASS INDEX (BMI) OF 31.0 TO 31.9 IN ADULT: ICD-10-CM

## 2022-06-17 DIAGNOSIS — F90.2 ATTENTION DEFICIT HYPERACTIVITY DISORDER (ADHD), COMBINED TYPE: Primary | ICD-10-CM

## 2022-06-17 PROBLEM — E66.811 CLASS 1 OBESITY WITH BODY MASS INDEX (BMI) OF 34.0 TO 34.9 IN ADULT: Status: RESOLVED | Noted: 2022-04-15 | Resolved: 2022-06-17

## 2022-06-17 PROBLEM — E66.9 CLASS 1 OBESITY WITH BODY MASS INDEX (BMI) OF 34.0 TO 34.9 IN ADULT: Status: RESOLVED | Noted: 2022-04-15 | Resolved: 2022-06-17

## 2022-06-17 PROCEDURE — 99213 OFFICE O/P EST LOW 20 MIN: CPT

## 2022-06-17 NOTE — PROGRESS NOTES
"Chief Complaint  ADD (Doing ok on vyvanse. Needing refills )    Subjective    History of Present Illness      Patient presents to [facility] for   Doing ok on vyvanse        Review of Systems   All other systems reviewed and are negative.      I have reviewed and agree with the HPI and ROS information as above.  Rufina Sherman, APRN     Objective   Vital Signs:   /79   Pulse 70   Temp 98.7 °F (37.1 °C)   Ht 180.3 cm (71\")   Wt 104 kg (229 lb)   BMI 31.94 kg/m²     BMI is >= 30 and <35. (Class 1 Obesity). The following options were offered after discussion;: exercise counseling/recommendations      Physical Exam  Constitutional:       Appearance: Normal appearance. He is well-developed.   HENT:      Head: Normocephalic and atraumatic.      Right Ear: External ear normal.      Left Ear: External ear normal.      Nose: Nose normal. No nasal tenderness or congestion.      Mouth/Throat:      Lips: Pink. No lesions.      Mouth: Mucous membranes are moist. No oral lesions.      Dentition: Normal dentition.      Pharynx: Oropharynx is clear. No pharyngeal swelling, oropharyngeal exudate or posterior oropharyngeal erythema.   Eyes:      General: Lids are normal. Vision grossly intact. No scleral icterus.        Right eye: No discharge.         Left eye: No discharge.      Extraocular Movements: Extraocular movements intact.      Conjunctiva/sclera: Conjunctivae normal.      Right eye: Right conjunctiva is not injected.      Left eye: Left conjunctiva is not injected.      Pupils: Pupils are equal, round, and reactive to light.   Cardiovascular:      Rate and Rhythm: Normal rate and regular rhythm.      Heart sounds: Normal heart sounds. No murmur heard.    No gallop.   Pulmonary:      Effort: Pulmonary effort is normal.      Breath sounds: Normal breath sounds and air entry. No wheezing, rhonchi or rales.   Musculoskeletal:         General: No tenderness or deformity. Normal range of " motion.      Cervical back: Full passive range of motion without pain, normal range of motion and neck supple.      Right lower leg: No edema.      Left lower leg: No edema.   Skin:     General: Skin is warm and dry.      Coloration: Skin is not jaundiced.      Findings: No rash.   Neurological:      Mental Status: He is alert and oriented to person, place, and time.      Cranial Nerves: Cranial nerves are intact.      Sensory: Sensation is intact.      Motor: Motor function is intact.      Coordination: Coordination is intact.      Gait: Gait is intact.   Psychiatric:         Attention and Perception: Attention normal.         Mood and Affect: Mood and affect normal.         Behavior: Behavior is not hyperactive. Behavior is cooperative.         Thought Content: Thought content normal.         Judgment: Judgment normal.          MARCOS-7:      PHQ-2 Depression Screening  Little interest or pleasure in doing things? 0-->not at all   Feeling down, depressed, or hopeless? 0-->not at all   PHQ-2 Total Score 0     PHQ-9 Depression Screening  Little interest or pleasure in doing things? 0-->not at all   Feeling down, depressed, or hopeless? 0-->not at all   Trouble falling or staying asleep, or sleeping too much?     Feeling tired or having little energy?     Poor appetite or overeating?     Feeling bad about yourself - or that you are a failure or have let yourself or your family down?     Trouble concentrating on things, such as reading the newspaper or watching television?     Moving or speaking so slowly that other people could have noticed? Or the opposite - being so fidgety or restless that you have been moving around a lot more than usual?     Thoughts that you would be better off dead, or of hurting yourself in some way?     PHQ-9 Total Score 0   If you checked off any problems, how difficult have these problems made it for you to do your work, take care of things at home, or get along with other people?         Result Review  Data Reviewed:                   Assessment and Plan      Problem List Items Addressed This Visit        Mental Health    Attention deficit hyperactivity disorder - Primary      Other Visit Diagnoses     Class 1 obesity due to excess calories with serious comorbidity and body mass index (BMI) of 31.0 to 31.9 in adult            Patient is seen today for ADHD follow-up.  Patient is doing well on Vyvanse 50 mg and wishes to continue same.  Francisco is pending UDS is up-to-date.          Follow Up   Return in about 3 months (around 9/17/2022) for ADHD.  Patient was given instructions and counseling regarding his condition or for health maintenance advice. Please see specific information pulled into the AVS if appropriate.

## 2022-08-16 DIAGNOSIS — F90.2 ATTENTION DEFICIT HYPERACTIVITY DISORDER (ADHD), COMBINED TYPE: ICD-10-CM

## 2022-09-13 ENCOUNTER — OFFICE VISIT (OUTPATIENT)
Dept: FAMILY MEDICINE CLINIC | Facility: CLINIC | Age: 37
End: 2022-09-13

## 2022-09-13 VITALS
OXYGEN SATURATION: 99 % | HEART RATE: 77 BPM | SYSTOLIC BLOOD PRESSURE: 116 MMHG | DIASTOLIC BLOOD PRESSURE: 73 MMHG | BODY MASS INDEX: 29.12 KG/M2 | WEIGHT: 208 LBS | RESPIRATION RATE: 12 BRPM | HEIGHT: 71 IN

## 2022-09-13 DIAGNOSIS — F90.2 ATTENTION DEFICIT HYPERACTIVITY DISORDER (ADHD), COMBINED TYPE: Primary | ICD-10-CM

## 2022-09-13 DIAGNOSIS — F41.8 MIXED ANXIETY AND DEPRESSIVE DISORDER: ICD-10-CM

## 2022-09-13 PROCEDURE — 99213 OFFICE O/P EST LOW 20 MIN: CPT | Performed by: NURSE PRACTITIONER

## 2022-09-13 NOTE — PROGRESS NOTES
"Chief Complaint  ADHD    Subjective        Mj Billy presents to [facility]  3 mo f/u for ADHD.  Patient states medication is working well in helping him focus.      Objective   Vital Signs:  /73   Pulse 77   Resp 12   Ht 180.3 cm (71\")   Wt 94.3 kg (208 lb)   SpO2 99%   BMI 29.01 kg/m²   Estimated body mass index is 29.01 kg/m² as calculated from the following:    Height as of this encounter: 180.3 cm (71\").    Weight as of this encounter: 94.3 kg (208 lb).    BMI is >= 30 and <35. (Class 1 Obesity). The following options were offered after discussion;: weight loss educational material (shared in after visit summary)      Physical Exam  Constitutional:       Appearance: Normal appearance. He is well-developed.   HENT:      Head: Normocephalic and atraumatic.      Right Ear: External ear normal.      Left Ear: External ear normal.      Nose: Nose normal. No nasal tenderness or congestion.      Mouth/Throat:      Lips: Pink. No lesions.      Mouth: Mucous membranes are moist. No oral lesions.      Dentition: Normal dentition.      Pharynx: Oropharynx is clear. No pharyngeal swelling, oropharyngeal exudate or posterior oropharyngeal erythema.   Eyes:      General: Lids are normal. Vision grossly intact. No scleral icterus.        Right eye: No discharge.         Left eye: No discharge.      Extraocular Movements: Extraocular movements intact.      Conjunctiva/sclera: Conjunctivae normal.      Right eye: Right conjunctiva is not injected.      Left eye: Left conjunctiva is not injected.      Pupils: Pupils are equal, round, and reactive to light.   Cardiovascular:      Rate and Rhythm: Normal rate and regular rhythm.      Heart sounds: Normal heart sounds. No murmur heard.    No gallop.   Pulmonary:      Effort: Pulmonary effort is normal.      Breath sounds: Normal breath sounds and air entry. No wheezing, rhonchi or rales.   Musculoskeletal:         General: No " tenderness or deformity. Normal range of motion.      Cervical back: Full passive range of motion without pain, normal range of motion and neck supple.      Right lower leg: No edema.      Left lower leg: No edema.   Skin:     General: Skin is warm and dry.      Coloration: Skin is not jaundiced.      Findings: No rash.   Neurological:      Mental Status: He is alert and oriented to person, place, and time.      Cranial Nerves: Cranial nerves are intact.      Sensory: Sensation is intact.      Motor: Motor function is intact.      Coordination: Coordination is intact.      Gait: Gait is intact.   Psychiatric:         Attention and Perception: Attention normal.         Mood and Affect: Mood and affect normal.         Behavior: Behavior is not hyperactive. Behavior is cooperative.         Thought Content: Thought content normal.         Judgment: Judgment normal.        Result Review :                Assessment and Plan   Diagnoses and all orders for this visit:    1. Attention deficit hyperactivity disorder (ADHD), combined type (Primary)    2. Mixed anxiety and depressive disorder  -     nefazodone (SERZONE) 100 MG tablet; Take 1 tablet by mouth 2 (Two) Times a Day.  Dispense: 60 tablet; Refill: 2    Patient comes in today to follow-up on ADHD and anxiety and depression.  Symptoms well controlled.  Wishes to continue same.  Francisco is clean.  Drug screen is up-to-date.         Follow Up   Return in about 3 months (around 12/13/2022).  Patient was given instructions and counseling regarding his condition or for health maintenance advice. Please see specific information pulled into the AVS if appropriate.

## 2022-09-14 DIAGNOSIS — F90.2 ATTENTION DEFICIT HYPERACTIVITY DISORDER (ADHD), COMBINED TYPE: ICD-10-CM

## 2022-09-14 NOTE — TELEPHONE ENCOUNTER
Cancelled previous script at [email or web address].  Please send new script.

## 2022-11-16 DIAGNOSIS — F90.2 ATTENTION DEFICIT HYPERACTIVITY DISORDER (ADHD), COMBINED TYPE: ICD-10-CM

## 2022-12-20 ENCOUNTER — OFFICE VISIT (OUTPATIENT)
Dept: FAMILY MEDICINE CLINIC | Facility: CLINIC | Age: 37
End: 2022-12-20

## 2022-12-20 VITALS
BODY MASS INDEX: 30.8 KG/M2 | HEIGHT: 71 IN | SYSTOLIC BLOOD PRESSURE: 137 MMHG | DIASTOLIC BLOOD PRESSURE: 91 MMHG | RESPIRATION RATE: 20 BRPM | HEART RATE: 81 BPM | WEIGHT: 220 LBS | TEMPERATURE: 98.3 F

## 2022-12-20 DIAGNOSIS — E66.9 CLASS 1 OBESITY WITH BODY MASS INDEX (BMI) OF 30.0 TO 30.9 IN ADULT, UNSPECIFIED OBESITY TYPE, UNSPECIFIED WHETHER SERIOUS COMORBIDITY PRESENT: ICD-10-CM

## 2022-12-20 DIAGNOSIS — F90.2 ATTENTION DEFICIT HYPERACTIVITY DISORDER (ADHD), COMBINED TYPE: Primary | ICD-10-CM

## 2022-12-20 DIAGNOSIS — F41.8 MIXED ANXIETY AND DEPRESSIVE DISORDER: ICD-10-CM

## 2022-12-20 DIAGNOSIS — I10 PRIMARY HYPERTENSION: ICD-10-CM

## 2022-12-20 PROCEDURE — 99214 OFFICE O/P EST MOD 30 MIN: CPT | Performed by: NURSE PRACTITIONER

## 2022-12-20 RX ORDER — AMLODIPINE BESYLATE AND BENAZEPRIL HYDROCHLORIDE 5; 10 MG/1; MG/1
1 CAPSULE ORAL DAILY
Qty: 30 CAPSULE | Refills: 2 | Status: SHIPPED | OUTPATIENT
Start: 2022-12-20 | End: 2023-03-20 | Stop reason: SDUPTHER

## 2022-12-20 NOTE — PROGRESS NOTES
"Chief Complaint  ADHD    Subjective    History of Present Illness      Patient presents to [facility] for   History of Present Illness  ADHD/Mood HPI    Visit for:  follow-up. Most recent visit was 3 months ago.  Interim changes to follow up on today: no change in medication  Work/School Performance:  going well  Cognitive:  able to focus    Behavior  Hyperactivity: is not hyperactive  Impulsivity: no impulsivity and no unsafe behavior  Tasking: able to initiate tasks, able to complete tasks and able to mult-task    Social  ADHD social/impulsive symptoms:  not impatient, does not blurt out inappropriate comments and no excessive talking    Behavioral health  Behavior: no concerns  Emotional coping: demonstrates feelings of no concerns       Review of Systems   Constitutional: Negative.    HENT: Negative.    Eyes: Negative.    Respiratory: Negative.    Cardiovascular: Negative.    Gastrointestinal: Negative.    Endocrine: Negative.    Genitourinary: Negative.    Musculoskeletal: Negative.    Skin: Negative.    Allergic/Immunologic: Negative.    Neurological: Negative.    Hematological: Negative.    Psychiatric/Behavioral: Negative.        I have reviewed and agree with the HPI and ROS information as above.  Sophia Sanchez, APRN     Objective   Vital Signs:   /91   Pulse 81   Temp 98.3 °F (36.8 °C)   Resp 20   Ht 180.3 cm (71\")   Wt 99.8 kg (220 lb)   BMI 30.68 kg/m²     BMI is >= 25 and <30. (Overweight) The following options were offered after discussion;: weight loss educational material (shared in after visit summary)      Physical Exam  Constitutional:       Appearance: Normal appearance. He is well-developed. He is obese.   HENT:      Head: Normocephalic and atraumatic.      Right Ear: External ear normal.      Left Ear: External ear normal.      Nose: Nose normal. No nasal tenderness or congestion.      Mouth/Throat:      Lips: Pink. No lesions.      Mouth: Mucous " membranes are moist. No oral lesions.      Dentition: Normal dentition.      Pharynx: Oropharynx is clear. No pharyngeal swelling, oropharyngeal exudate or posterior oropharyngeal erythema.   Eyes:      General: Lids are normal. Vision grossly intact. No scleral icterus.        Right eye: No discharge.         Left eye: No discharge.      Extraocular Movements: Extraocular movements intact.      Conjunctiva/sclera: Conjunctivae normal.      Right eye: Right conjunctiva is not injected.      Left eye: Left conjunctiva is not injected.      Pupils: Pupils are equal, round, and reactive to light.   Cardiovascular:      Rate and Rhythm: Normal rate and regular rhythm.      Heart sounds: Normal heart sounds. No murmur heard.    No gallop.   Pulmonary:      Effort: Pulmonary effort is normal.      Breath sounds: Normal breath sounds and air entry. No wheezing, rhonchi or rales.   Musculoskeletal:         General: No tenderness or deformity. Normal range of motion.      Cervical back: Full passive range of motion without pain, normal range of motion and neck supple.      Right lower leg: No edema.      Left lower leg: No edema.   Skin:     General: Skin is warm and dry.      Coloration: Skin is not jaundiced.      Findings: No rash.   Neurological:      Mental Status: He is alert and oriented to person, place, and time.      Cranial Nerves: Cranial nerves are intact.      Sensory: Sensation is intact.      Motor: Motor function is intact.      Coordination: Coordination is intact.      Gait: Gait is intact.   Psychiatric:         Attention and Perception: Attention normal.         Mood and Affect: Mood and affect normal.         Behavior: Behavior is not hyperactive. Behavior is cooperative.         Thought Content: Thought content normal.         Judgment: Judgment normal.          MARCOS-7:      PHQ-2 Depression Screening  Little interest or pleasure in doing things? 0-->not at all   Feeling down, depressed, or hopeless?  0-->not at all   PHQ-2 Total Score 0     PHQ-9 Depression Screening  Little interest or pleasure in doing things? 0-->not at all   Feeling down, depressed, or hopeless? 0-->not at all   Trouble falling or staying asleep, or sleeping too much?     Feeling tired or having little energy?     Poor appetite or overeating?     Feeling bad about yourself - or that you are a failure or have let yourself or your family down?     Trouble concentrating on things, such as reading the newspaper or watching television?     Moving or speaking so slowly that other people could have noticed? Or the opposite - being so fidgety or restless that you have been moving around a lot more than usual?     Thoughts that you would be better off dead, or of hurting yourself in some way?     PHQ-9 Total Score 0   If you checked off any problems, how difficult have these problems made it for you to do your work, take care of things at home, or get along with other people?        Result Review  Data Reviewed:   The following data was reviewed by: DONNA Cote on 12/20/2022:  Urine Drug Screen - Urine, Clean Catch (04/15/2022 09:52)             Assessment and Plan      Diagnoses and all orders for this visit:    1. Attention deficit hyperactivity disorder (ADHD), combined type (Primary)    2. Mixed anxiety and depressive disorder  -     nefazodone (SERZONE) 100 MG tablet; Take 1 tablet by mouth 2 (Two) Times a Day.  Dispense: 60 tablet; Refill: 2    3. Primary hypertension  -     amLODIPine-benazepril (LOTREL 5-10) 5-10 MG per capsule; Take 1 capsule by mouth Daily.  Dispense: 30 capsule; Refill: 2    4. Class 1 obesity with body mass index (BMI) of 30.0 to 30.9 in adult, unspecified obesity type, unspecified whether serious comorbidity present      Patient here today for 3-month ADHD follow-up.  He is also requesting refills of his other chronic medications.  He feels as though his current dose of Vyvanse is working well for his focus.   Blood pressure stable in office today.  He states he has had some low readings at times, but feels that his blood pressure is more stable now that he has gained some weight.  He is questioning if he needs to hold his blood pressure medication if his readings become too low again.  Anxiety depression symptoms stable with nefazodone.    Plan:    1.  Continue Vyvanse 50 mg.  Francisco is clean.  UDS is up-to-date and appropriate.  2.  Refill sent of amlodipine-benazepril, refill sent. Instructed to continue to monitor bp at this time. May consider holding if he is getting low readings consistently.   3.  Advised and refill sent.  4.  Follow-up 3 months.    ADHD Follow up:    PDMP reviewed and is clean. ADRS (Adult ADHD Assessment Form) reviewed in detail, scanned in chart, and has been reviewed at time of appointment.  All questions, including medication and side effects, were discussed in detail at time of patient's visit. Patient will continue same medication which was discussed at today's visit. Patient is to return in 3 month(s).    Last Urine Toxicity     LAST URINE TOXICITY RESULTS Latest Ref Rng & Units 4/15/2022    AMPHETAMINES SCREEN, URINE Negative Negative    BARBITURATES SCREEN Negative Negative    BENZODIAZEPINE SCREEN, URINE Negative Negative    BUPRENORPHINEUR Negative Negative    COCAINE SCREEN, URINE Negative Negative    METHADONE SCREEN, URINE Negative Negative    METHAMPHETAMINEUR Negative Negative           Urine Drug Screen Results: UDS RESULTS: Current results appropriate    Follow Up   Return in about 3 months (around 3/20/2023) for Recheck.  Patient was given instructions and counseling regarding his condition or for health maintenance advice. Please see specific information pulled into the AVS if appropriate.

## 2023-01-31 ENCOUNTER — E-VISIT (OUTPATIENT)
Dept: FAMILY MEDICINE CLINIC | Facility: TELEHEALTH | Age: 38
End: 2023-01-31
Payer: COMMERCIAL

## 2023-01-31 PROCEDURE — BRIGHTMDVISIT: Performed by: NURSE PRACTITIONER

## 2023-01-31 NOTE — EXTERNAL PATIENT INSTRUCTIONS
View Doctor's Note     Diagnosis   Strep pharyngitis (strep throat)   My name is Kate Deutsch, and I'm a healthcare provider at [facility]. I reviewed your interview, and I see that you have strep throat. This can be a painful condition, but it responds well to treatment.   To prevent the spread of illness to others, I recommend that you stay home and away from other people as much as possible while you're sick. When you need to be around other people, consider wearing a face mask.   I've given you a doctor's note for 2 days.   Medications   Your pharmacy   [facility] [address] [place] [address] [address] [place] [address] [phone]     Prescription   Amoxicillin (500mg): Take 1 capsule by mouth twice a day for 10 days for infection. This medication is an antibiotic. Take it exactly as directed. You must finish the entire course of medication, even if you feel better after the first few days of treatment.    Start taking the antibiotics I've prescribed right away. You need to finish the entire course of antibiotics, even if you start to feel better before the pills run out.   About your diagnosis   Strep throat is an infection in the throat and tonsils caused by group A streptococcus bacteria. Strep throat is most often spread by droplets that are released into the air after an infected person coughs or sneezes. You can also get strep throat by sharing cups or utensils with an infected person.   Symptoms usually begin within 2 to 5 days after a person has been exposed. The pain from strep throat usually starts quickly and can be severe, especially when swallowing. Body aches and pains are common.   What to expect   If you follow this treatment plan, you should start to feel better within a few days.   When to seek care   Call us at [phone]   with any sudden or unexpected symptoms.    Symptoms that don't improve within 48 hours of starting antibiotics.    Symptoms that get better for a few days and then  "suddenly get worse.    Worsening fever.    Your throat pain becomes worse and makes swallowing extremely difficult or impossible.    Muffled voice (it may sound like you are talking with a mouthful of food).    Difficulty opening your mouth or jaw.    Stiff neck.    Joint pain, or swelling that moves from joint to joint.    Severe stomach pain.    Shortness of breath.    Nosebleed.    Severe fatigue.    A new rash.    Odd emotional or behavioral changes.    Uncontrollable body movements or \"jerkiness.\"    Severe chest pain.   Other treatment    Rest and drink plenty of water.    Choose throat-friendly liquids and foods, such as lemon tea, broth, applesauce, frozen yogurt, or popsicles.    Gargle with salt water several times a day to help with your throat pain.    Try cough drops and throat lozenges for extra relief.    Stir a teaspoon of honey into warm water or weak tea to help soothe a sore throat.    Avoid smoke and air pollution. Smoke can make infections worse.   Prevention    Avoid close contact with other people when you're sick.    Cover your mouth and nose when you cough or sneeze. Use a tissue or cough into your elbow. Make sure that used tissues go directly into the trash.    Avoid touching your eyes, nose, or mouth while you're sick.    Wash your hands often, especially after coughing, sneezing, or blowing your nose. If soap and water are not available, use an alcohol-based hand .    If you or someone in your home or workplace is sick, disinfect commonly used items. This includes door handles, tables, computers, remotes, and pens.    Coronavirus (COVID-19) information   Common symptoms of COVID-19 include fever, cough, shortness of breath, fatigue, muscle or body aches, headaches, new loss of sense of taste or smell, sore throat, stuffy or runny nose, nausea or vomiting, and diarrhea. Most people who get COVID-19 have mild symptoms and can rest at home until they get better. Elderly people and " those with chronic medical problems may be at risk for more serious complications.   FAQs about the COVID-19 vaccine   There are four authorized COVID-19 vaccines: Cristino & Cristino's Antony Vaccine (J&J/Antony), Moderna, Novavax, and Pfizer-BioNTech (Pfizer). The J&J/Antony and Novavax vaccines are approved for use in people aged 18 and older. The Moderna and Pfizer vaccines are approved for those aged 6 months and older. All four are available at no cost. Even if you don't have health insurance, you can still get the COVID-19 vaccine for free.   Which vaccine is the best? Which vaccine should I get?   All four vaccines are highly effective. Even if you get COVID-19 after being vaccinated, all of the vaccines help prevent severe disease, hospitalization, and complications.   Most people should get whichever vaccine is first available to them. However, women younger than 50 years old should consider the rare risk of blood clots with low platelets after vaccination with the J&J/Antony vaccine. This risk hasn't been seen with the other three vaccines.   Are the vaccines safe?   Yes. Hundreds of millions of people in the [place] have already safely received COVID-19 vaccines under the most intense safety monitoring in the history of the [place].   Do I need the vaccine if I've already had COVID?   Yes. Vaccination helps protect you even if you've already had COVID.   If you had COVID-19 and had symptoms, wait to get vaccinated until you've recovered and completed your isolation period.   If you tested positive for COVID-19 but did not have symptoms, you can get vaccinated after 5 full days have passed since you had a positive test, as long as you don't develop symptoms.   How many doses of the vaccine do I need?   Visit www.cdc.gov/coronavirus/2019-ncov/vaccines/stay-up-to-date.html   to find out how to stay up to date with your COVID-19 vaccines.   I'm immunocompromised. How many doses of the vaccine do I need?   For  information on how immunocompromised people can stay up to date with their COVID-19 vaccines, visit www.cdc.gov/coronavirus/2019-ncov/vaccines/recommendations/immuno.html  .   What are the common side effects of the vaccine?   A sore arm, tiredness, headache, and muscle pain may occur within two days of getting the vaccine and last a day or two. For the Moderna or Pfizer vaccines, side effects are more common after the second dose. People over the age of 55 are less likely to have side effects than younger people.   After I'm up to date on vaccines, can I still get or spread COVID?   Yes, you can still get COVID, but your disease should be milder. And your risk of serious illness, hospitalization, and complications will be much lower, especially if you're up to date. Unfortunately, you can still spread COVID if you've been vaccinated. That's why it's important to follow isolation guidelines if you get sick or test positive.   After I'm up to date on vaccines, can I go back to normal?   You should still wear a mask indoors in public if:    It's required by laws, rules, regulations, or local guidance.    You have a weakened immune system.    Your age puts you at increased risk of severe disease.    You have a medical condition that puts you at increased risk of severe disease.    Someone in your household has a weakened immune system, is at increased risk for severe disease, or is unvaccinated.    You're in an area of high transmission.   Where can I get a COVID-19 vaccine?   Visit [facility]'s website for more information. To find a COVID-19 vaccination site near you, visit www.vaccines.gov/  , call [phone]  , or text your zip code to 850252 ([employer]). Message and data rates may apply.   What about travel?   Travel increases your risk of exposure to COVID-19. For more information, see www.cdc.gov/coronavirus/2019-ncov/travelers/index.html  .   I've had close contact with someone who has COVID. Do I need to  quarantine, and if so, for how long?   For the most current answer, including a calculator to determine whether you need to stay home and for how long, visit www.cdc.gov/coronavirus/2019-ncov/your-health/quarantine-isolation.html  .   I've tested positive for COVID. How long do I need to isolate?   For the latest recommendations, including a calculator to determine how long you need to stay home, visit www.cdc.gov/coronavirus/2019-ncov/your-health/quarantine-isolation.html  .   What if I develop symptoms that might be from COVID?   For the latest recommendations on what to do if you're sick, including when to seek emergency care, visit www.cdc.gov/coronavirus/2019-ncov/if-you-are-sick/steps-when-sick.html  .    Flu vaccine information   Who should get a flu vaccine?   Everyone 6 months of age and older should get a yearly flu vaccine.   When should I get vaccinated?   You should get a flu vaccine by the end of October. Once you're vaccinated, it takes about two weeks for antibodies to develop and protect you against the flu. That's why it's important to get vaccinated as soon as possible.   After October, is it too late to get vaccinated?   No. You should still get vaccinated. As long as the flu viruses are still in your community, flu vaccines will remain available, even into January of next year or later.   Why do I need a flu vaccine EVERY year?   Flu viruses are constantly changing, so flu vaccines are usually updated from one season to the next. Your protection from the flu vaccine also lessens over time.   Is the flu vaccine safe?   Yes. Over the last 50 years, hundreds of millions of Americans have safely received the flu vaccines.   What are the side effects of flu vaccines?   You CANNOT get the flu from a flu vaccine. Common side effects of the flu shot include soreness, redness and/or swelling where the shot was given, low grade fever, and aches. Common side effects of the nasal spray flu vaccine for  adults include runny nose, headaches, sore throat, and cough. For children, side effects include wheezing, vomiting, muscle aches, and fever.   Does the flu vaccine increase your risk of getting COVID-19?   No. There is no evidence that getting a flu vaccine increases your risk of getting COVID-19.   Is it safe to get the flu vaccine along with a COVID-19 vaccine?   Yes. It's safe to get the flu vaccine with a COVID-19 vaccine or booster.   Contact your healthcare provider TODAY for details on when and where to get your flu vaccine.   Your provider   Your diagnosis was provided by Kate Deutsch, a member of your trusted care team at [facility].   If you have any questions, call us at [phone]  .   View Doctor's Note     Expires on 03/02/23

## 2023-01-31 NOTE — E-VISIT TREATED
Chief Complaint: Cold, flu, COVID, sinus, hay fever, or seasonal allergies   Patient introduction   Patient is 37-year-old male with sore throat that started less than 48 hours ago. Regarding date of symptom onset, patient writes: 01/29/2023.   COVID-19 exposure, testing history, and vaccination status:    No known exposure to a person with a confirmed or suspected case of COVID-19.    Recent travel outside of their local community.    Patient did a self-test within the last 24 hours. Test result was negative.    Received 2 doses of the COVID-19 vaccine (Pfizer, Pfizer).    Received their most recent dose of the vaccine more than 14 days ago.   Risk factors for severe disease from COVID-19 infection:    BMI >= 25.   Warning. The following may warrant further investigation:    Hypertension.   Patient-submitted comments: Had a fever the last two days, but that seemed to clear up And now I just have these swollen tonsils and a sore throat. .   Patient requests a 2-day excuse note.   General presentation   Symptoms came on gradually.   Fever:    No fever.   Sinus and nasal symptoms:    No nasal or sinus congestion.    No nasal discharge.    No itchy nose or sneezing.   Throat symptoms:    Sore throat.    No known recent strep exposure.    Patient thinks they have strep.    Has pain when swallowing but can swallow liquids and solid foods.   Head and body aches:    No headache.    No sweats.    No chills.    No myalgia.    No fatigue.   Cough:    No cough.   Wheezing and shortness of breath:    No COPD diagnosis.    No asthma diagnosis.    No wheezing.    No shortness of breath.   Chest pain:    No chest pain.   Ear symptoms:    None.   Dizziness:    No dizziness.   Allergies:    No history of allergies.   Flu exposure:    No recent known exposure to a person with a confirmed flu diagnosis.    Has not had a flu vaccine this season.   Patient is taking over-the-counter medications for current symptoms, including  acetaminophen.   Review of red flags/alarm symptoms:    No changes in alertness or awareness.    No symptoms suggesting airway obstruction.    No decreased urination.   Self-exam:    Height: 182 centimeters    Weight: 97.9 kilograms    No difficulty moving their chin toward their chest.    Tonsillar edema.    Purulent tonsils.    Palatal petechiae.    Swollen/painful neck lymph nodes.    Has not taken antibiotics for similar symptoms within the past month.   Current medications   Currently taking nefazodone 100 MG tablet, lisdexamfetamine 50 MG capsule, amLODIPine-benazepril 5-10 MG per capsule, and lisdexamfetamine 50 MG capsule.   Medication allergies   None.   Medication contraindication review   Patient has history of hypertension. Therefore, the following medication(s) will not be prescribed:    Metoclopramide.    Acetaminophen-diphenhydramine-phenylephrine.    Pseudoephedrine.    Aspirin-chlorpheniramine-phenylephrine.   No history of metoclopramide-associated dystonic reaction and tardive dyskinesia.   No known history of amoxicillin-clavulanate-associated cholestatic jaundice or hepatic impairment.   No known history of azithromycin-associated cholestatic jaundice or hepatic impairment.   Past medical history   Immune conditions: No immunocompromising conditions. No history of cancer.   Social history   High-risk household contacts: Patient's household includes one or more members of a group with risk factors for influenza complications, including a child younger than 5 years.   Never smoked tobacco.   Assessment   Strep pharyngitis. Ruled out: Traumatic laryngitis.   This is the likely diagnosis based on patient's interview responses and symptoms, including:    Sore throat.    No cough.    Purulent tonsils.    Palatal petechiae.    Swollen/painful neck lymph nodes.   Plan   Medications:    amoxicillin 500 mg capsule RX 500mg 1 cap PO bid 10d for infection. This medication is an antibiotic. Take it exactly  as directed. You must finish the entire course of medication, even if you feel better after the first few days of treatment. Amount is 20 cap.   The patient's prescription will be sent to:   [facility]   [address] [place] [address] Dr. Diamond [address] [id number]   Phone: [phone]     Fax: [phone]   Other:   Patient was given an excuse note for 2 days.   Education:    Condition and causes    Prevention    Treatment and self-care    When to call provider   ----------   Electronically signed by DONNA Pereyra on 2023-01-31 at 14:09PM   ----------   Patient Interview Transcript:   Please carefully consider each question and answer as best you can. This helps your provider give you the best care. Which of these symptoms are bothering you? Select all that apply.    Sore throat   Not selected:    Cough    Shortness of breath    Fever    Stuffed-up nose or sinuses    Runny nose    Itchy or watery eyes    Itchy nose or sneezing    Loss of smell or taste    Hoarse voice or loss of voice    Headache    Sweats    Chills    Muscle or body aches    Fatigue or tiredness    Nausea or vomiting    Diarrhea    I don't have any of these symptoms   Since your current symptoms started, have you had a viral test for COVID-19? - This includes home self-tests as well as nose swab or saliva tests done at a doctor's office, lab, or testing site. - It does NOT include antibody tests, which use a blood sample to test for past infection. Select one.    Yes   Not selected:    No   When was your most recent COVID-19 test? Select one.    Within the last 24 hours   Not selected:    Within the last week (specify date as MM/DD/YY)    7 to 14 days ago    15 to 30 days ago    More than 1 month ago   What type of COVID-19 test did you most recently have? Select one.    Viral self-test or home test   Not selected:    Viral test at a doctor's office, lab, or testing site   What was the result of your most recent COVID-19 test? Select one.    Negative    "Not selected:    Positive   Have you gotten the COVID-19 vaccine? Select one.    Yes   Not selected:    No   How many total doses of the COVID-19 vaccine have you gotten? This includes boosters as well as additional doses for those who are immunocompromised. Select one.    2 doses   Not selected:    1 dose    3 doses    4 doses    5 doses   1st dose    Pfizer   Not selected:    J&J/Antony    Moderna    Novavax   2nd dose    Pfizer   Not selected:    J&J/Antony    Moderna    Novavax   When did you get your most recent dose of the COVID-19 vaccine?    More than 14 days ago   Not selected:    Less than 48 hours (2 days) ago    48 to 72 hours (3 days) ago    3 to 5 days ago    5 to 7 days ago    7 to 14 days ago   In the last 14 days, have you traveled outside of your local community? This includes travel by car, RV, bus, train, or plane. Travel increases your chances of getting and spreading COVID-19. Select one.    Yes   Not selected:    No   In the last 14 days, have you had close contact with someone who has coronavirus (COVID-19)? \"Close contact\" means any of these: - Living in the same household as someone with COVID-19. - Caring for someone with COVID-19. - Being within 6 feet of someone with COVID-19 for a total of at least 15 minutes over a 24-hour period. For example, three 5-minute exposures for a total of 15 minutes. - Being in direct contact with respiratory droplets from someone with COVID-19 (being coughed on, kissing, sharing utensils). Select one.    No, not that I know of   Not selected:    Yes, a confirmed case    Yes, a suspected case   When did your current symptoms start? Select one.    Less than 48 hours ago   Not selected:    3 to 5 days ago    6 to 9 days ago    10 to 14 days ago    2 to 3 weeks ago    3 to 4 weeks ago    More than a month ago   Do you know the exact date your symptoms started? If so, enter the date as MM/DD/YY. Select one.    Yes (specify): 01/29/2023   Not selected:    No   " Did your symptoms come on suddenly or gradually? Select one.    Gradually   Not selected:    Suddenly    I'm not sure   Can you swallow liquids and solid foods? A sore throat may be painful when swallowing, but it shouldn't prevent you from swallowing. Select one.    Yes, but it's painful   Not selected:    Yes, with ease    Yes, but it's uncomfortable    It's hard to swallow anything because it feels like liquids and food get stuck in my throat    No, I can't swallow anything, liquid or solid foods   Since your symptoms started, have you felt dizzy? Select one.    No   Not selected:    Yes, but I can continue with my regular daily activities    Yes, and it makes it hard to stand, walk, or do daily activities   Do you have chest pain? You might also feel it as discomfort, aching, tightness, or squeezing in the chest. Select one.    No   Not selected:    Yes   Have you urinated at least 3 times in the last 24 hours? Select one.    Yes   Not selected:    No   Changes in alertness or awareness may mean you need emergency care. Since your symptoms started, have you had any of these? Select all that apply.    None of the above   Not selected:    Confusion    Slurred speech    Not knowing where you are or what day it is    Difficulty staying conscious    Fainting or passing out   Do your symptoms include a whistling sound, or wheezing, when you breathe? Select one.    No   Not selected:    Yes    I'm not sure   Do you have any of these symptoms in your ear(s)? Select all that apply.    None of the above   Not selected:    Pain    Pressure    Fullness    Crackling or popping    Plugged or blocked sensation   Can you move your chin toward your chest?    Yes   Not selected:    No, my neck is too stiff   Are your tonsils larger than usual?    Yes   Not selected:    No, not that I can tell    I've had my tonsils removed   Is there any white or yellow pus on your tonsils?    Yes   Not selected:    No, not that I can see   Are  there red spots on the roof of your mouth or the back of your throat?    Yes   Not selected:    No, not that I can see   Are your glands/lymph nodes swollen, or does it hurt when you touch them?    Yes   Not selected:    No, not that I can tell   In the past 2 weeks, has anyone around you (such as at school, work, or home) had a confirmed diagnosis of strep throat? A confirmed diagnosis means that a throat swab and lab test were done to verify a strep throat infection. Select one.    No, not that I know of   Not selected:    Yes   Do you think you might have strep throat? Select one.    Yes   Not selected:    No   In the past week, has anyone around you (such as at school, work, or home) had a confirmed diagnosis of the flu? A confirmed diagnosis means that a nose swab was done to verify a flu infection. Select all that apply.    No, not that I know of   Not selected:    I live with someone who has the flu    I've been within touching distance of someone who has the flu    I've walked by, or sat about 3 feet away from, someone who has the flu    I've been in the same building as someone who has the flu   Have you ever been diagnosed with asthma? Select one.    No   Not selected:    Yes   Have you ever been diagnosed with chronic obstructive pulmonary disease (COPD)? Select one.    No, not that I know of   Not selected:    Yes   In the past month, have you taken antibiotics for similar symptoms? Examples of antibiotics include amoxicillin, amoxicillin-clavulanate (Augmentin), penicillin, cefdinir (Omnicef), doxycycline, and clindamycin (Cleocin). Select one.    No   Not selected:    Yes   Do you have allergies (pollen, dust mites, mold, animal dander)? Select one.    No, not that I know of   Not selected:    Yes   Have you had a flu shot this season? Select one.    No   Not selected:    Yes, less than 2 weeks ago    Yes, 2 to 4 weeks ago    Yes, 1 to 3 months ago    Yes, 3 to 6 months ago    Yes, more than 6 months  ago   The flu and COVID-19 can be more serious for people with certain conditions or characteristics. These questions help us figure out if you or anyone you live with is at higher risk for complications from these infections. Do either of these statements apply to you? Select all that apply.    None of the above   Not selected:    I'm  or Native [place]    I'm a healthcare worker   Do you smoke tobacco? Select one.    No   Not selected:    Yes, every day    Yes, some days    No, I quit   Do you have any of these conditions? Select all that apply.    None of the above   Not selected:    Chronic lung disease, such as cystic fibrosis or interstitial fibrosis    Heart disease, such as congenital heart disease, congestive heart failure, or coronary artery disease    Disorder of the brain, spinal cord, or nerves and muscles, such as dementia, cerebral palsy, epilepsy, muscular dystrophy, or developmental delay    Metabolic disorder or mitochondrial disease    Cerebrovascular disease, such as stroke or another condition affecting the blood vessels or blood supply to the brain    Down syndrome    Mood disorder, including depression or schizophrenia spectrum disorders    Substance use disorder, such as alcohol, opioid, or cocaine use disorder    Tuberculosis   Do you live in a group care setting? Examples include: - Nursing home - Residential care - Psychiatric treatment facility - [facility] - Board [facility] care home - Homeless shelter - Foster care setting Select one.    No   Not selected:    Yes   Are you a healthcare worker? Select one.    No   Not selected:    Yes   People with a very high body mass index (BMI) are at higher risk for developing complications from the flu and severe illness from COVID-19. To determine your BMI, we need to know your weight and height. Please enter your weight (in pounds).    Weight   Please enter your height.    Height   Do you have any of these conditions that can  affect the immune system? Scroll to see all options. Select all that apply.    None of these   Not selected:    History of bone marrow transplant    Chronic kidney disease    Chronic liver disease (including cirrhosis)    HIV/AIDS    Inflammatory bowel disease (Crohn's disease or ulcerative colitis)    Lupus    Moderate to severe plaque psoriasis    Multiple sclerosis    Rheumatoid arthritis    Sickle cell anemia    Alpha or beta thalassemia    History of solid organ transplant (kidney, liver, or heart)    History of spleen removal    An autoimmune disorder not listed here    A condition requiring treatment with long-term use of oral steroids (such as prednisone, prednisolone, or dexamethasone)   Have you ever been diagnosed with cancer? Select one.    No   Not selected:    Yes, I have cancer now    Yes, but I'm in remission   Do any of these apply to you? Select all that apply.    None of the above   Not selected:    I've been hospitalized within the last 5 days    I have diabetes    I'm in close contact with a child in    Do any of these apply to the people who live with you? Select all that apply.    A child under the age of 5   Not selected:    An adult 65 or older    A person who is pregnant    A person who has given birth, had a miscarriage, had a pregnancy loss, or had an  in the last 2 weeks    An  or Native [place]    None of the above   Does any member of your household have any of these medical conditions? Select all that apply.    None of the above   Not selected:    Asthma    Disorders of the brain, spinal cord, or nerves and muscles, such as dementia, cerebral palsy, epilepsy, muscular dystrophy, or developmental delay    Chronic lung disease, such as COPD or cystic fibrosis    Heart disease, such as congenital heart disease, congestive heart failure, or coronary artery disease    Cerebrovascular disease, such as stroke or another condition affecting the blood vessels or  blood supply to the brain    Blood disorders, such as sickle cell disease    Diabetes    Metabolic disorders such as inherited metabolic disorders or mitochondrial disease    Kidney disorders    Liver disorders    Weakened immune system due to illness or medications such as chemotherapy or steroids    Children under the age of 19 who are on long-term aspirin therapy    Extreme obesity (BMI > 40)   Do you have any of these conditions? Scroll to see all options. Select all that apply.    High blood pressure   Not selected:    Aspirin triad (also known as Samter's triad or ASA triad)    Asthma or hives from taking aspirin or other NSAIDs, such as ibuprofen or naproxen    Blockage or narrowing of the blood vessels of the heart    Blood clotting disorder    Blood dyscrasia, such anemia, leukemia, lymphoma, or myeloma    Bone marrow depression    Catecholamine-releasing paraganglioma    Congenital long QT syndrome    Depression    Difficulty urinating or completely emptying your bladder    Uncorrected electrolyte abnormalities    Fungal infection    Gastrointestinal (GI) bleeding    Gastrointestinal (GI) obstruction    G6PD deficiency    Recent heart attack    Irregular heartbeat or heart rhythm    Mononucleosis (mono)    Myasthenia gravis    Parkinson's disease    Pheochromocytoma    Reye syndrome    Seizure disorder    Thyroid disease    Ulcerative colitis    None of the above   Have you ever had either of these conditions? Select all that apply.    No   Not selected:    Metoclopramide-associated dystonic reaction    Tardive dyskinesia   Just a few more questions about medications, and then you're finished. Have you used any non-prescription medications or nasal sprays for your current symptoms? Examples include saline sprays, decongestants, NyQuil, and Tylenol. Select one.    Yes   Not selected:    No   Which of these non-prescription medications have you tried? Scroll to see all options. Select all that apply.     Acetaminophen (Tylenol)   Not selected:    Budesonide (Rhinocort)    Cetirizine (Zyrtec)    Chlorpheniramine (Aller-chlor, Chlor-Trimeton)    Cromolyn (NasalCrom)    Dextromethorphan (Delsym, Robitussin, Vicks DayQuil Cough)    Diphenhydramine (Benadryl)    Fexofenadine (Allegra)    Fluticasone (Flonase)    Guaifenesin (Mucinex)    Guaifenesin/dextromethorphan (Delsym DM, Mucinex DM, Robitussin DM)    Ibuprofen (Advil, Motrin, Midol)    Ketotifen (Alaway, Zaditor)    Loratadine (Alavert, Claritin)    Naphazoline-pheniramine (Naphcon-A, Opcon-A, Visine-A)    Omeprazole (Prilosec)    Oxymetazoline (Afrin)    Phenylephrine (Sudafed PE)    Triamcinolone (Nasacort)    None of the above   Have you taken any monoamine oxidase inhibitor (MAOI) medications in the last 14 days? Examples include rasagiline (Azilect), selegiline (Eldepryl, Zelapar), isocarboxazid (Marplan), phenelzine (Nardil), and tranylcypromine (Parnate). Select one.    No, not that I know of   Not selected:    Yes   Do you take Kynmobi or Apokyn (apomorphine)? Select one.    No   Not selected:    Yes   Are you still taking these medications listed in your medical record? If you're not taking any of these, click Next. Select all that apply.    nefazodone 100 MG tablet    lisdexamfetamine 50 MG capsule    amLODIPine-benazepril 5-10 MG per capsule    lisdexamfetamine 50 MG capsule   Are you taking any other medications, vitamins, or supplements? Select one.    No   Not selected:    Yes   Have you ever had an allergic or bad reaction to any medication? Select one.    No   Not selected:    Yes   Are you allergic to milk or to the proteins found in milk (for example, whey or casein)? A milk allergy is different from lactose intolerance. Select one.    No, not that I know of   Not selected:    Yes   Have you ever had jaundice or liver problems as a result of taking amoxicillin-clavulanate (Augmentin)? Jaundice is a condition in which the skin and the whites of  the eyes turn yellow. Select all that apply.    No, not that I know of   Not selected:    Yes, jaundice    Yes, liver problems   Have you ever had jaundice or liver problems as a result of taking azithromycin (Zithromax, Zmax)? Jaundice is a condition in which the skin and the whites of the eyes turn yellow. Select all that apply.    No, not that I know of   Not selected:    Yes, jaundice    Yes, liver problems   Do you need a doctor's note? A doctor's note confirms that you received care today and states when you can return to school or work. It does not contain information about your diagnosis or treatment plan. Your provider will make the final decision on whether to give you a doctor's note and for how long. Doctor's notes CANNOT be backdated. We can't provide medical leave paperwork through this type of visit. If more paperwork is needed to request time off, contact your primary care provider. Select one.    Today and tomorrow (2 days)   Not selected:    Today only (1 day)    3 days    5 days    7 days    10 days    14 days    No   Is there anything else you'd like to tell us about your symptoms?    Had a fever the last two days, but that seemed to clear up And now I just have these swollen tonsils and a sore throat.   ----------   Medical history   Medical history data does not currently exist for this patient.

## 2023-02-21 DIAGNOSIS — F90.2 ATTENTION DEFICIT HYPERACTIVITY DISORDER (ADHD), COMBINED TYPE: ICD-10-CM

## 2023-02-21 RX ORDER — LISDEXAMFETAMINE DIMESYLATE 50 MG
CAPSULE ORAL
Qty: 30 CAPSULE | Refills: 0 | Status: SHIPPED | OUTPATIENT
Start: 2023-02-21

## 2023-03-20 ENCOUNTER — TELEPHONE (OUTPATIENT)
Dept: FAMILY MEDICINE CLINIC | Facility: CLINIC | Age: 38
End: 2023-03-20
Payer: COMMERCIAL

## 2023-03-20 ENCOUNTER — LAB (OUTPATIENT)
Dept: LAB | Facility: HOSPITAL | Age: 38
End: 2023-03-20
Payer: COMMERCIAL

## 2023-03-20 ENCOUNTER — OFFICE VISIT (OUTPATIENT)
Dept: FAMILY MEDICINE CLINIC | Facility: CLINIC | Age: 38
End: 2023-03-20
Payer: COMMERCIAL

## 2023-03-20 VITALS
SYSTOLIC BLOOD PRESSURE: 129 MMHG | BODY MASS INDEX: 33.18 KG/M2 | HEIGHT: 71 IN | HEART RATE: 78 BPM | WEIGHT: 237 LBS | RESPIRATION RATE: 20 BRPM | DIASTOLIC BLOOD PRESSURE: 82 MMHG

## 2023-03-20 DIAGNOSIS — F90.2 ATTENTION DEFICIT HYPERACTIVITY DISORDER (ADHD), COMBINED TYPE: Primary | ICD-10-CM

## 2023-03-20 DIAGNOSIS — I10 PRIMARY HYPERTENSION: ICD-10-CM

## 2023-03-20 DIAGNOSIS — F41.8 MIXED ANXIETY AND DEPRESSIVE DISORDER: ICD-10-CM

## 2023-03-20 DIAGNOSIS — F90.2 ATTENTION DEFICIT HYPERACTIVITY DISORDER (ADHD), COMBINED TYPE: ICD-10-CM

## 2023-03-20 DIAGNOSIS — E66.09 CLASS 1 OBESITY DUE TO EXCESS CALORIES WITH SERIOUS COMORBIDITY AND BODY MASS INDEX (BMI) OF 33.0 TO 33.9 IN ADULT: ICD-10-CM

## 2023-03-20 PROBLEM — E66.9 CLASS 1 OBESITY WITH BODY MASS INDEX (BMI) OF 30.0 TO 30.9 IN ADULT: Status: RESOLVED | Noted: 2022-04-15 | Resolved: 2023-03-20

## 2023-03-20 PROBLEM — E66.811 CLASS 1 OBESITY WITH BODY MASS INDEX (BMI) OF 30.0 TO 30.9 IN ADULT: Status: RESOLVED | Noted: 2022-04-15 | Resolved: 2023-03-20

## 2023-03-20 LAB
AMPHET+METHAMPHET UR QL: POSITIVE
AMPHETAMINES UR QL: NEGATIVE
BARBITURATES UR QL SCN: NEGATIVE
BENZODIAZ UR QL SCN: NEGATIVE
BUPRENORPHINE SERPL-MCNC: NEGATIVE NG/ML
CANNABINOIDS SERPL QL: NEGATIVE
COCAINE UR QL: NEGATIVE
METHADONE UR QL SCN: NEGATIVE
OPIATES UR QL: NEGATIVE
OXYCODONE UR QL SCN: NEGATIVE
PCP UR QL SCN: NEGATIVE
PROPOXYPH UR QL: NEGATIVE
TRICYCLICS UR QL SCN: NEGATIVE

## 2023-03-20 PROCEDURE — 80306 DRUG TEST PRSMV INSTRMNT: CPT

## 2023-03-20 PROCEDURE — 99214 OFFICE O/P EST MOD 30 MIN: CPT

## 2023-03-20 RX ORDER — AMOXICILLIN 500 MG/1
CAPSULE ORAL
COMMUNITY
Start: 2023-01-31

## 2023-03-20 RX ORDER — AMLODIPINE BESYLATE AND BENAZEPRIL HYDROCHLORIDE 5; 10 MG/1; MG/1
1 CAPSULE ORAL DAILY
Qty: 30 CAPSULE | Refills: 2 | Status: SHIPPED | OUTPATIENT
Start: 2023-03-20

## 2023-03-20 RX ORDER — DEXTROAMPHETAMINE SACCHARATE, AMPHETAMINE ASPARTATE, DEXTROAMPHETAMINE SULFATE AND AMPHETAMINE SULFATE 2.5; 2.5; 2.5; 2.5 MG/1; MG/1; MG/1; MG/1
10 TABLET ORAL DAILY
Qty: 30 TABLET | Refills: 0 | Status: SHIPPED | OUTPATIENT
Start: 2023-03-20

## 2023-03-20 NOTE — TELEPHONE ENCOUNTER
----- Message from DONNA Spring sent at 3/20/2023 10:06 AM CDT -----  Uds satisfactory; meds sent

## 2023-03-20 NOTE — PROGRESS NOTES
"Chief Complaint  ADHD, Hypertension, and Anxiety    Subjective    History of Present Illness      Patient presents to [facility] for   History of Present Illness  Pt reports that the Vyvanse is working well but, it wears off too early in the day.    ADHD/Mood HPI    Visit for:  follow-up. Most recent visit was 3 months ago.  Interim changes to follow up on today: no change in medication  Work/School Performance:  going well  Cognitive:  able to focus    Behavior  Hyperactivity: is not hyperactive  Impulsivity: no impulsivity and no unsafe behavior  Tasking: able to initiate tasks, able to complete tasks and able to mult-task    Social  ADHD social/impulsive symptoms:  not impatient, does not blurt out inappropriate comments and no excessive talking    Behavioral health  Behavior: no concerns  Emotional coping: demonstrates feelings of no concerns  Anxiety doing well  HTN stable        Review of Systems   All other systems reviewed and are negative.      I have reviewed and agree with the HPI and ROS information as above.  Rufina Sherman, APRN     Objective   Vital Signs:   /82   Pulse 78   Resp 20   Ht 180.3 cm (71\")   Wt 108 kg (237 lb)   BMI 33.05 kg/m²     BMI is >= 30 and <35. (Class 1 Obesity). The following options were offered after discussion;: exercise counseling/recommendations, nutrition counseling/recommendations and pharmacological intervention options      Physical Exam  Constitutional:       Appearance: Normal appearance. He is well-developed. He is obese.   HENT:      Head: Normocephalic and atraumatic.      Right Ear: External ear normal.      Left Ear: External ear normal.      Nose: Nose normal. No nasal tenderness or congestion.      Mouth/Throat:      Lips: Pink. No lesions.      Mouth: Mucous membranes are moist. No oral lesions.      Dentition: Normal dentition.      Pharynx: Oropharynx is clear. No pharyngeal swelling, oropharyngeal exudate or " posterior oropharyngeal erythema.   Eyes:      General: Lids are normal. Vision grossly intact. No scleral icterus.        Right eye: No discharge.         Left eye: No discharge.      Extraocular Movements: Extraocular movements intact.      Conjunctiva/sclera: Conjunctivae normal.      Right eye: Right conjunctiva is not injected.      Left eye: Left conjunctiva is not injected.      Pupils: Pupils are equal, round, and reactive to light.   Cardiovascular:      Rate and Rhythm: Normal rate and regular rhythm.      Heart sounds: Normal heart sounds. No murmur heard.    No gallop.   Pulmonary:      Effort: Pulmonary effort is normal.      Breath sounds: Normal breath sounds and air entry. No wheezing, rhonchi or rales.   Musculoskeletal:         General: No tenderness or deformity. Normal range of motion.      Cervical back: Full passive range of motion without pain, normal range of motion and neck supple.      Right lower leg: No edema.      Left lower leg: No edema.   Skin:     General: Skin is warm and dry.      Coloration: Skin is not jaundiced.      Findings: No rash.   Neurological:      Mental Status: He is alert and oriented to person, place, and time.      Sensory: Sensation is intact.      Motor: Motor function is intact.      Coordination: Coordination is intact.      Gait: Gait is intact.   Psychiatric:         Attention and Perception: Attention normal.         Mood and Affect: Mood and affect normal.         Behavior: Behavior is not hyperactive. Behavior is cooperative.         Thought Content: Thought content normal.         Judgment: Judgment normal.          MARCOS-7:      PHQ-2 Depression Screening  Little interest or pleasure in doing things? 0-->not at all   Feeling down, depressed, or hopeless? 0-->not at all   PHQ-2 Total Score 0     PHQ-9 Depression Screening  Little interest or pleasure in doing things? 0-->not at all   Feeling down, depressed, or hopeless? 0-->not at all   Trouble falling or  staying asleep, or sleeping too much?     Feeling tired or having little energy?     Poor appetite or overeating?     Feeling bad about yourself - or that you are a failure or have let yourself or your family down?     Trouble concentrating on things, such as reading the newspaper or watching television?     Moving or speaking so slowly that other people could have noticed? Or the opposite - being so fidgety or restless that you have been moving around a lot more than usual?     Thoughts that you would be better off dead, or of hurting yourself in some way?     PHQ-9 Total Score 0   If you checked off any problems, how difficult have these problems made it for you to do your work, take care of things at home, or get along with other people?        Result Review  Data Reviewed:                   Assessment and Plan      Diagnoses and all orders for this visit:    1. Attention deficit hyperactivity disorder (ADHD), combined type (Primary)  -     Urine Drug Screen - Urine, Clean Catch; Future    2. Class 1 obesity due to excess calories with serious comorbidity and body mass index (BMI) of 33.0 to 33.9 in adult    3. Mixed anxiety and depressive disorder  -     nefazodone (SERZONE) 100 MG tablet; Take 1 tablet by mouth 2 (Two) Times a Day.  Dispense: 60 tablet; Refill: 2    4. Primary hypertension  -     amLODIPine-benazepril (LOTREL 5-10) 5-10 MG per capsule; Take 1 capsule by mouth Daily.  Dispense: 30 capsule; Refill: 2      Patient is seen today for adhd, anxiety, depression and HTN. Patient is doing well on all currents except his c/o his Vyvnase wearing off around 12-1pm. Patient has been on this medication regimen for quite sometime. I discussed with him that we can increase his Vyvnase to 60mg or add in a afternoon Adderall 10 to see if this would help him get through the rest of his day.     Plan  1. Cont Vyvnase 50mg and add in Afternoon Adderall 10mg. UDS obtained today, wanda pending  2. Anxiety and  Depression stable with Nefazodone . Patient denies any HI/SI   3. HTN stable with Lotrel 5-10.           Follow Up   Return in about 1 month (around 4/20/2023) for ADHD.  Patient was given instructions and counseling regarding his condition or for health maintenance advice. Please see specific information pulled into the AVS if appropriate.       Answers for HPI/ROS submitted by the patient on 3/20/2023  What is the primary reason for your visit?: Other  Please describe your symptoms.: ADD check up  Have you had these symptoms before?: Yes  How long have you been having these symptoms?: Greater than 2 weeks  Please list any medications you are currently taking for this condition.: Vyvanse

## 2023-03-20 NOTE — PATIENT INSTRUCTIONS

## 2023-04-21 ENCOUNTER — OFFICE VISIT (OUTPATIENT)
Dept: FAMILY MEDICINE CLINIC | Facility: CLINIC | Age: 38
End: 2023-04-21
Payer: COMMERCIAL

## 2023-04-21 VITALS
SYSTOLIC BLOOD PRESSURE: 130 MMHG | HEART RATE: 71 BPM | DIASTOLIC BLOOD PRESSURE: 85 MMHG | OXYGEN SATURATION: 98 % | HEIGHT: 71 IN | WEIGHT: 241 LBS | BODY MASS INDEX: 33.74 KG/M2

## 2023-04-21 DIAGNOSIS — E66.09 CLASS 1 OBESITY DUE TO EXCESS CALORIES WITH SERIOUS COMORBIDITY AND BODY MASS INDEX (BMI) OF 33.0 TO 33.9 IN ADULT: ICD-10-CM

## 2023-04-21 DIAGNOSIS — F90.2 ATTENTION DEFICIT HYPERACTIVITY DISORDER (ADHD), COMBINED TYPE: Primary | ICD-10-CM

## 2023-04-21 DIAGNOSIS — I10 PRIMARY HYPERTENSION: ICD-10-CM

## 2023-04-21 DIAGNOSIS — F41.8 MIXED ANXIETY AND DEPRESSIVE DISORDER: ICD-10-CM

## 2023-04-21 PROCEDURE — 99214 OFFICE O/P EST MOD 30 MIN: CPT

## 2023-04-21 RX ORDER — DEXTROAMPHETAMINE SACCHARATE, AMPHETAMINE ASPARTATE, DEXTROAMPHETAMINE SULFATE AND AMPHETAMINE SULFATE 2.5; 2.5; 2.5; 2.5 MG/1; MG/1; MG/1; MG/1
10 TABLET ORAL DAILY
Qty: 30 TABLET | Refills: 0 | Status: SHIPPED | OUTPATIENT
Start: 2023-05-19 | End: 2023-06-18

## 2023-04-21 RX ORDER — DEXTROAMPHETAMINE SACCHARATE, AMPHETAMINE ASPARTATE, DEXTROAMPHETAMINE SULFATE AND AMPHETAMINE SULFATE 2.5; 2.5; 2.5; 2.5 MG/1; MG/1; MG/1; MG/1
10 TABLET ORAL DAILY
Qty: 30 TABLET | Refills: 0 | Status: SHIPPED | OUTPATIENT
Start: 2023-04-21 | End: 2023-05-21

## 2023-04-21 RX ORDER — AMLODIPINE BESYLATE AND BENAZEPRIL HYDROCHLORIDE 5; 10 MG/1; MG/1
1 CAPSULE ORAL DAILY
Qty: 30 CAPSULE | Refills: 2 | Status: SHIPPED | OUTPATIENT
Start: 2023-04-21

## 2023-04-21 NOTE — PROGRESS NOTES
"Chief Complaint  ADHD (Adderall booster added last visit)    Subjective    History of Present Illness      Patient presents to [facility] for   History of Present Illness  ADHD/Mood HPI    Visit for:  follow-up. Most recent visit was 1 month ago.  Interim changes to follow up on today: no change in medication  Work/School Performance:  going well and improving  Cognitive:  able to focus    Behavior  Hyperactivity: is not hyperactive  Impulsivity: no impulsivity  Tasking: able to initiate tasks and difficulty completing tasks at times    Social  ADHD social/impulsive symptoms:  not impatient and no excessive talking    Behavioral health  Behavior: no concerns  Emotional coping: demonstrates feelings of no concerns       Review of Systems   All other systems reviewed and are negative.      I have reviewed and agree with the HPI and ROS information as above.  Rufina Sherman, APRN     Objective   Vital Signs:   /85   Pulse 71   Ht 180.3 cm (71\")   Wt 109 kg (241 lb)   SpO2 98%   BMI 33.61 kg/m²     BMI is >= 30 and <35. (Class 1 Obesity). The following options were offered after discussion;: exercise counseling/recommendations, nutrition counseling/recommendations and pharmacological intervention options      Physical Exam  Constitutional:       Appearance: Normal appearance. He is well-developed. He is obese.   HENT:      Head: Normocephalic and atraumatic.      Right Ear: External ear normal.      Left Ear: External ear normal.      Nose: Nose normal. No nasal tenderness or congestion.      Mouth/Throat:      Lips: Pink. No lesions.      Mouth: Mucous membranes are moist. No oral lesions.      Dentition: Normal dentition.      Pharynx: Oropharynx is clear. No pharyngeal swelling, oropharyngeal exudate or posterior oropharyngeal erythema.   Eyes:      General: Lids are normal. Vision grossly intact. No scleral icterus.        Right eye: No discharge.         Left eye: No " discharge.      Extraocular Movements: Extraocular movements intact.      Conjunctiva/sclera: Conjunctivae normal.      Right eye: Right conjunctiva is not injected.      Left eye: Left conjunctiva is not injected.      Pupils: Pupils are equal, round, and reactive to light.   Cardiovascular:      Rate and Rhythm: Normal rate and regular rhythm.      Heart sounds: Normal heart sounds. No murmur heard.    No gallop.   Pulmonary:      Effort: Pulmonary effort is normal.      Breath sounds: Normal breath sounds and air entry. No wheezing, rhonchi or rales.   Musculoskeletal:         General: No tenderness or deformity. Normal range of motion.      Cervical back: Full passive range of motion without pain, normal range of motion and neck supple.      Right lower leg: No edema.      Left lower leg: No edema.   Skin:     General: Skin is warm and dry.      Coloration: Skin is not jaundiced.      Findings: No rash.   Neurological:      Mental Status: He is alert and oriented to person, place, and time.      Sensory: Sensation is intact.      Motor: Motor function is intact.      Coordination: Coordination is intact.      Gait: Gait is intact.   Psychiatric:         Attention and Perception: Attention normal.         Mood and Affect: Mood and affect normal.         Behavior: Behavior is not hyperactive. Behavior is cooperative.         Thought Content: Thought content normal.         Judgment: Judgment normal.          MARCOS-7:      PHQ-2 Depression Screening  Little interest or pleasure in doing things?     Feeling down, depressed, or hopeless?     PHQ-2 Total Score       PHQ-9 Depression Screening  Little interest or pleasure in doing things?     Feeling down, depressed, or hopeless?     Trouble falling or staying asleep, or sleeping too much?     Feeling tired or having little energy?     Poor appetite or overeating?     Feeling bad about yourself - or that you are a failure or have let yourself or your family down?      Trouble concentrating on things, such as reading the newspaper or watching television?     Moving or speaking so slowly that other people could have noticed? Or the opposite - being so fidgety or restless that you have been moving around a lot more than usual?     Thoughts that you would be better off dead, or of hurting yourself in some way?     PHQ-9 Total Score     If you checked off any problems, how difficult have these problems made it for you to do your work, take care of things at home, or get along with other people?        Result Review  Data Reviewed:                   Assessment and Plan      Diagnoses and all orders for this visit:    1. Attention deficit hyperactivity disorder (ADHD), combined type (Primary)    2. Primary hypertension  -     amLODIPine-benazepril (LOTREL 5-10) 5-10 MG per capsule; Take 1 capsule by mouth Daily.  Dispense: 30 capsule; Refill: 2    3. Mixed anxiety and depressive disorder  -     nefazodone (SERZONE) 100 MG tablet; Take 1 tablet by mouth 2 (Two) Times a Day.  Dispense: 60 tablet; Refill: 2    4. Class 1 obesity due to excess calories with serious comorbidity and body mass index (BMI) of 33.0 to 33.9 in adult      Patient is seen today for adhd follow up, anxiety and htn. Patient is doing well on current medication regimens and wishes to continue same.     Plan  1. ADHD stable with Vyvanse 50mg and adderall 10mg. UDS is UTD. [place] pending  2. Anxiety and depression stable Serzone . Patient denies any HI/SI   3. HTN stable with Lotrel     ADHD Follow up:    PDMP reviewed and pending. ADRS (Adult ADHD Assessment Form) reviewed in detail, scanned in chart, and has been reviewed at time of appointment.  All questions, including medication and side effects, were discussed in detail at time of patient's visit. Patient will continue same medication which was discussed at today's visit. Patient is to return in 3 month(s).    Last Urine Toxicity         Latest Ref Rng & Units  3/20/2023 4/15/2022   LAST URINE TOXICITY RESULTS   Amphetamine, Urine Qual Negative Positive   Negative     Barbiturates Screen, Urine Negative Negative   Negative     Benzodiazepine Screen, Urine Negative Negative   Negative     Buprenorphine, Screen, Urine Negative Negative   Negative     Cocaine Screen, Urine Negative Negative   Negative     Methadone Screen , Urine Negative Negative   Negative     Methamphetamine, Ur Negative Negative   Negative           Multiple values from one day are sorted in reverse-chronological order              Urine Drug Screen Results: UDS RESULTS: Current results appropriate      Follow Up   Return in about 3 months (around 7/21/2023) for ADHD.  Patient was given instructions and counseling regarding his condition or for health maintenance advice. Please see specific information pulled into the AVS if appropriate.       Answers for HPI/ROS submitted by the patient on 4/16/2023  What is the primary reason for your visit?: Other  Please describe your symptoms.: Medicine check  Have you had these symptoms before?: No  How long have you been having these symptoms?: 1-4 days

## 2023-08-10 ENCOUNTER — TELEPHONE (OUTPATIENT)
Dept: FAMILY MEDICINE CLINIC | Facility: CLINIC | Age: 38
End: 2023-08-10
Payer: COMMERCIAL

## 2023-08-22 ENCOUNTER — OFFICE VISIT (OUTPATIENT)
Dept: FAMILY MEDICINE CLINIC | Facility: CLINIC | Age: 38
End: 2023-08-22
Payer: COMMERCIAL

## 2023-08-22 VITALS
OXYGEN SATURATION: 98 % | DIASTOLIC BLOOD PRESSURE: 72 MMHG | SYSTOLIC BLOOD PRESSURE: 120 MMHG | BODY MASS INDEX: 32.48 KG/M2 | HEART RATE: 99 BPM | WEIGHT: 232 LBS | TEMPERATURE: 98.2 F | HEIGHT: 71 IN

## 2023-08-22 DIAGNOSIS — F90.2 ATTENTION DEFICIT HYPERACTIVITY DISORDER (ADHD), COMBINED TYPE: ICD-10-CM

## 2023-08-22 DIAGNOSIS — F95.2 GILLES DE LA TOURETTE'S SYNDROME: ICD-10-CM

## 2023-08-22 DIAGNOSIS — E66.9 CLASS 1 OBESITY WITH BODY MASS INDEX (BMI) OF 32.0 TO 32.9 IN ADULT, UNSPECIFIED OBESITY TYPE, UNSPECIFIED WHETHER SERIOUS COMORBIDITY PRESENT: ICD-10-CM

## 2023-08-22 DIAGNOSIS — Z11.59 ENCOUNTER FOR HEPATITIS C SCREENING TEST FOR LOW RISK PATIENT: ICD-10-CM

## 2023-08-22 DIAGNOSIS — Z78.9 NONSMOKER: ICD-10-CM

## 2023-08-22 DIAGNOSIS — Z00.00 WELLNESS EXAMINATION: Primary | ICD-10-CM

## 2023-08-22 PROCEDURE — 99395 PREV VISIT EST AGE 18-39: CPT | Performed by: NURSE PRACTITIONER

## 2023-08-22 NOTE — PROGRESS NOTES
"Chief Complaint  Annual Exam    Subjective    History of Present Illness      Patient presents to [facility] for   History of Present Illness     Review of Systems   Constitutional: Negative.    HENT: Negative.     Eyes: Negative.    Respiratory: Negative.     Cardiovascular: Negative.    Gastrointestinal: Negative.    Endocrine: Negative.    Genitourinary: Negative.    Musculoskeletal: Negative.    Skin: Negative.    Allergic/Immunologic: Negative.    Neurological: Negative.    Hematological: Negative.    Psychiatric/Behavioral: Negative.       I have reviewed and agree with the HPI and ROS information as above.  Sophia Sanchez, DONNA     Objective   Vital Signs:   /72   Pulse 99   Temp 98.2 øF (36.8 øC) (Temporal)   Ht 180.3 cm (71\")   Wt 105 kg (232 lb)   SpO2 98%   BMI 32.36 kg/mý            Physical Exam  Constitutional:       Appearance: Normal appearance. He is well-developed. He is obese.   HENT:      Head: Normocephalic and atraumatic.      Right Ear: External ear normal.      Left Ear: External ear normal.      Nose: Nose normal. No nasal tenderness or congestion.      Mouth/Throat:      Lips: Pink. No lesions.      Mouth: Mucous membranes are moist. No oral lesions.      Dentition: Normal dentition.      Pharynx: Oropharynx is clear. No pharyngeal swelling, oropharyngeal exudate or posterior oropharyngeal erythema.   Eyes:      General: Lids are normal. Vision grossly intact. No scleral icterus.        Right eye: No discharge.         Left eye: No discharge.      Extraocular Movements: Extraocular movements intact.      Conjunctiva/sclera: Conjunctivae normal.      Right eye: Right conjunctiva is not injected.      Left eye: Left conjunctiva is not injected.      Pupils: Pupils are equal, round, and reactive to light.   Cardiovascular:      Rate and Rhythm: Normal rate and regular rhythm.      Heart sounds: Normal heart sounds. No murmur heard.    No gallop. "   Pulmonary:      Effort: Pulmonary effort is normal.      Breath sounds: Normal breath sounds and air entry. No wheezing, rhonchi or rales.   Musculoskeletal:         General: No tenderness or deformity. Normal range of motion.      Cervical back: Full passive range of motion without pain, normal range of motion and neck supple.      Right lower leg: No edema.      Left lower leg: No edema.   Skin:     General: Skin is warm and dry.      Coloration: Skin is not jaundiced.      Findings: No rash.   Neurological:      Mental Status: He is alert and oriented to person, place, and time.      Sensory: Sensation is intact.      Motor: Motor function is intact.      Coordination: Coordination is intact.      Gait: Gait is intact.   Psychiatric:         Attention and Perception: Attention normal.         Mood and Affect: Mood and affect normal.         Behavior: Behavior is not hyperactive. Behavior is cooperative.         Thought Content: Thought content normal.         Judgment: Judgment normal.        MARCOS-7:      PHQ-2 Depression Screening  Little interest or pleasure in doing things? 0-->not at all   Feeling down, depressed, or hopeless? 0-->not at all   PHQ-2 Total Score 0     PHQ-9 Depression Screening  Little interest or pleasure in doing things? 0-->not at all   Feeling down, depressed, or hopeless? 0-->not at all   Trouble falling or staying asleep, or sleeping too much?     Feeling tired or having little energy?     Poor appetite or overeating?     Feeling bad about yourself - or that you are a failure or have let yourself or your family down?     Trouble concentrating on things, such as reading the newspaper or watching television?     Moving or speaking so slowly that other people could have noticed? Or the opposite - being so fidgety or restless that you have been moving around a lot more than usual?     Thoughts that you would be better off dead, or of hurting yourself in some way?     PHQ-9 Total Score 0    If you checked off any problems, how difficult have these problems made it for you to do your work, take care of things at home, or get along with other people?        Result Review  Data Reviewed:            Assessment and Plan      Diagnoses and all orders for this visit:    1. Wellness examination (Primary)  -     Hemoglobin A1c; Future  -     CBC Auto Differential; Future  -     Comprehensive Metabolic Panel; Future  -     Lipid Panel; Future  -     TSH; Future  -     Urinalysis With Culture If Indicated - Urine, Clean Catch; Future    2. Encounter for hepatitis C screening test for low risk patient  -     Hepatitis C antibody; Future    3. Morgan de la Tourette's syndrome    4. Attention deficit hyperactivity disorder (ADHD), combined type    5. Nonsmoker    6. Class 1 obesity with body mass index (BMI) of 32.0 to 32.9 in adult, unspecified obesity type, unspecified whether serious comorbidity present      Patient here today for yearly wellness exam.  He denies any complaints or concerns today.  He is up-to-date with his dental and eye exams.  He is due for routine labs today.  He is up-to-date with his tetanus vaccine.  He states he had this completed at [facility] and applies a on 6/17/2023.  He is up-to-date with his COVID vaccines.  He is not sure of the dates of these but states he had these completed at [facility]. Pt is a nonsmoker. BMI indicates obesity. Blood pressure elevated initially, but wnl on recheck.  Routine labs ordered for pt to complete including Hep C screening. Will call [facility] to get covid and tetanus records. Pt to follow up in October for adhd follow up. Otherwise f/u as needed.         Follow Up   Return if symptoms worsen or fail to improve.  Patient was given instructions and counseling regarding his condition or for health maintenance advice. Please see specific information pulled into the AVS if appropriate.

## 2023-08-25 ENCOUNTER — TELEPHONE (OUTPATIENT)
Dept: FAMILY MEDICINE CLINIC | Facility: CLINIC | Age: 38
End: 2023-08-25
Payer: COMMERCIAL

## 2023-08-25 DIAGNOSIS — E78.1 HYPERTRIGLYCERIDEMIA: Primary | ICD-10-CM

## 2023-08-25 LAB
ALBUMIN SERPL-MCNC: 5.3 G/DL (ref 3.5–5.2)
ALBUMIN/GLOB SERPL: 2.3 G/DL
ALP SERPL-CCNC: 94 U/L (ref 39–117)
ALT SERPL W P-5'-P-CCNC: 20 U/L (ref 1–41)
ANION GAP SERPL CALCULATED.3IONS-SCNC: 10 MMOL/L (ref 5–15)
AST SERPL-CCNC: 20 U/L (ref 1–40)
BASOPHILS # BLD AUTO: 0.04 10*3/MM3 (ref 0–0.2)
BASOPHILS NFR BLD AUTO: 0.5 % (ref 0–1.5)
BILIRUB SERPL-MCNC: 0.5 MG/DL (ref 0–1.2)
BILIRUB UR QL STRIP: NEGATIVE
BUN SERPL-MCNC: 9 MG/DL (ref 6–20)
BUN/CREAT SERPL: 10.8 (ref 7–25)
CALCIUM SPEC-SCNC: 9.8 MG/DL (ref 8.6–10.5)
CHLORIDE SERPL-SCNC: 101 MMOL/L (ref 98–107)
CHOLEST SERPL-MCNC: 177 MG/DL (ref 0–200)
CLARITY UR: CLEAR
CO2 SERPL-SCNC: 28 MMOL/L (ref 22–29)
COLOR UR: YELLOW
CREAT SERPL-MCNC: 0.83 MG/DL (ref 0.76–1.27)
DEPRECATED RDW RBC AUTO: 38.5 FL (ref 37–54)
EGFRCR SERPLBLD CKD-EPI 2021: 114.2 ML/MIN/1.73
EOSINOPHIL # BLD AUTO: 0.08 10*3/MM3 (ref 0–0.4)
EOSINOPHIL NFR BLD AUTO: 1.1 % (ref 0.3–6.2)
ERYTHROCYTE [DISTWIDTH] IN BLOOD BY AUTOMATED COUNT: 12.8 % (ref 12.3–15.4)
GLOBULIN UR ELPH-MCNC: 2.3 GM/DL
GLUCOSE SERPL-MCNC: 91 MG/DL (ref 65–99)
GLUCOSE UR STRIP-MCNC: NEGATIVE MG/DL
HBA1C MFR BLD: 5 % (ref 4.8–5.6)
HCT VFR BLD AUTO: 42.6 % (ref 37.5–51)
HCV AB SER DONR QL: NORMAL
HDLC SERPL-MCNC: 32 MG/DL (ref 40–60)
HGB BLD-MCNC: 14.5 G/DL (ref 13–17.7)
HGB UR QL STRIP.AUTO: NEGATIVE
IMM GRANULOCYTES # BLD AUTO: 0.02 10*3/MM3 (ref 0–0.05)
IMM GRANULOCYTES NFR BLD AUTO: 0.3 % (ref 0–0.5)
KETONES UR QL STRIP: NEGATIVE
LDLC SERPL CALC-MCNC: 92 MG/DL (ref 0–100)
LDLC/HDLC SERPL: 2.55 {RATIO}
LEUKOCYTE ESTERASE UR QL STRIP.AUTO: NEGATIVE
LYMPHOCYTES # BLD AUTO: 3.12 10*3/MM3 (ref 0.7–3.1)
LYMPHOCYTES NFR BLD AUTO: 42.5 % (ref 19.6–45.3)
MCH RBC QN AUTO: 28.6 PG (ref 26.6–33)
MCHC RBC AUTO-ENTMCNC: 34 G/DL (ref 31.5–35.7)
MCV RBC AUTO: 84 FL (ref 79–97)
MONOCYTES # BLD AUTO: 0.57 10*3/MM3 (ref 0.1–0.9)
MONOCYTES NFR BLD AUTO: 7.8 % (ref 5–12)
NEUTROPHILS NFR BLD AUTO: 3.51 10*3/MM3 (ref 1.7–7)
NEUTROPHILS NFR BLD AUTO: 47.8 % (ref 42.7–76)
NITRITE UR QL STRIP: NEGATIVE
NRBC BLD AUTO-RTO: 0 /100 WBC (ref 0–0.2)
PH UR STRIP.AUTO: 6.5 [PH] (ref 5–8)
PLATELET # BLD AUTO: 264 10*3/MM3 (ref 140–450)
PMV BLD AUTO: 9.6 FL (ref 6–12)
POTASSIUM SERPL-SCNC: 3.8 MMOL/L (ref 3.5–5.2)
PROT SERPL-MCNC: 7.6 G/DL (ref 6–8.5)
PROT UR QL STRIP: NEGATIVE
RBC # BLD AUTO: 5.07 10*6/MM3 (ref 4.14–5.8)
SODIUM SERPL-SCNC: 139 MMOL/L (ref 136–145)
SP GR UR STRIP: 1.02 (ref 1–1.03)
TRIGL SERPL-MCNC: 317 MG/DL (ref 0–150)
TSH SERPL DL<=0.05 MIU/L-ACNC: 1.34 UIU/ML (ref 0.27–4.2)
UROBILINOGEN UR QL STRIP: NORMAL
VLDLC SERPL-MCNC: 53 MG/DL (ref 5–40)
WBC NRBC COR # BLD: 7.34 10*3/MM3 (ref 3.4–10.8)

## 2023-08-25 PROCEDURE — 80061 LIPID PANEL: CPT | Performed by: NURSE PRACTITIONER

## 2023-08-25 PROCEDURE — 86803 HEPATITIS C AB TEST: CPT | Performed by: NURSE PRACTITIONER

## 2023-08-25 PROCEDURE — 83036 HEMOGLOBIN GLYCOSYLATED A1C: CPT | Performed by: NURSE PRACTITIONER

## 2023-08-25 PROCEDURE — 36415 COLL VENOUS BLD VENIPUNCTURE: CPT | Performed by: NURSE PRACTITIONER

## 2023-08-25 PROCEDURE — 80050 GENERAL HEALTH PANEL: CPT | Performed by: NURSE PRACTITIONER

## 2023-08-25 PROCEDURE — 81003 URINALYSIS AUTO W/O SCOPE: CPT | Performed by: NURSE PRACTITIONER

## 2023-08-25 RX ORDER — ATORVASTATIN CALCIUM 10 MG/1
10 TABLET, FILM COATED ORAL DAILY
Qty: 90 TABLET | Refills: 0 | Status: SHIPPED | OUTPATIENT
Start: 2023-08-25

## 2023-08-25 NOTE — PROGRESS NOTES
CMP- stable  Lipid panel- trigs are elevated. Start fish oil otc and start lipitor 10mg daily. Watch diet. Repeat in 3 months.   CBC- stable  TSH- wnl  Urinalysis- clear  Hep C screening- nonreactive  HgbA1c- stable at 5.0

## 2023-09-25 DIAGNOSIS — F90.2 ATTENTION DEFICIT HYPERACTIVITY DISORDER (ADHD), COMBINED TYPE: ICD-10-CM

## 2023-09-25 DIAGNOSIS — I10 PRIMARY HYPERTENSION: ICD-10-CM

## 2023-09-25 RX ORDER — AMLODIPINE BESYLATE AND BENAZEPRIL HYDROCHLORIDE 5; 10 MG/1; MG/1
1 CAPSULE ORAL DAILY
Qty: 30 CAPSULE | Refills: 2 | Status: SHIPPED | OUTPATIENT
Start: 2023-09-25

## 2023-09-25 RX ORDER — DEXTROAMPHETAMINE SACCHARATE, AMPHETAMINE ASPARTATE, DEXTROAMPHETAMINE SULFATE AND AMPHETAMINE SULFATE 2.5; 2.5; 2.5; 2.5 MG/1; MG/1; MG/1; MG/1
10 TABLET ORAL DAILY
Qty: 30 TABLET | Refills: 0 | Status: SHIPPED | OUTPATIENT
Start: 2023-09-25 | End: 2023-10-28

## 2023-09-25 RX ORDER — LISDEXAMFETAMINE DIMESYLATE CAPSULES 50 MG/1
50 CAPSULE ORAL EVERY MORNING
Qty: 30 CAPSULE | Refills: 0 | Status: SHIPPED | OUTPATIENT
Start: 2023-09-25 | End: 2023-10-25

## 2023-10-05 ENCOUNTER — TELEPHONE (OUTPATIENT)
Dept: FAMILY MEDICINE CLINIC | Facility: CLINIC | Age: 38
End: 2023-10-05
Payer: COMMERCIAL

## 2023-10-05 NOTE — TELEPHONE ENCOUNTER
Adderall 10mg tablet PA denied. Denial scanned into chart. Has not tried or failed 2 of the following: dexmethylphenidate er, methylphenidate tablet, dexmethylphenidate, or atomoxetine. Pay out of pocket or switch?

## 2023-10-06 ENCOUNTER — PATIENT MESSAGE (OUTPATIENT)
Dept: FAMILY MEDICINE CLINIC | Facility: CLINIC | Age: 38
End: 2023-10-06
Payer: COMMERCIAL

## 2023-10-06 NOTE — TELEPHONE ENCOUNTER
No need to switch classes of med, he can either increase to vyvanse 60mg total but would likely not be able to fill this yet or continue afternoon afternoon IR dose but self pay for it, shouldn't be expensive for this.

## 2023-10-20 ENCOUNTER — OFFICE VISIT (OUTPATIENT)
Dept: FAMILY MEDICINE CLINIC | Facility: CLINIC | Age: 38
End: 2023-10-20
Payer: COMMERCIAL

## 2023-10-20 VITALS
BODY MASS INDEX: 31.92 KG/M2 | SYSTOLIC BLOOD PRESSURE: 140 MMHG | HEIGHT: 71 IN | RESPIRATION RATE: 20 BRPM | DIASTOLIC BLOOD PRESSURE: 90 MMHG | WEIGHT: 228 LBS

## 2023-10-20 DIAGNOSIS — F39 MOOD DISORDER: ICD-10-CM

## 2023-10-20 DIAGNOSIS — F90.2 ATTENTION DEFICIT HYPERACTIVITY DISORDER (ADHD), COMBINED TYPE: Primary | ICD-10-CM

## 2023-10-20 PROCEDURE — 99214 OFFICE O/P EST MOD 30 MIN: CPT

## 2023-10-20 PROCEDURE — 96127 BRIEF EMOTIONAL/BEHAV ASSMT: CPT

## 2023-10-20 RX ORDER — LAMOTRIGINE 25 MG/1
TABLET ORAL
Qty: 45 TABLET | Refills: 0 | Status: SHIPPED | OUTPATIENT
Start: 2023-10-20

## 2023-10-20 NOTE — PATIENT INSTRUCTIONS
Discharge Instructions - Mood Disorder Follow Up     - You will need to return to the office as instructed for follow up, or sooner if you develop symptoms  - Medication should be taken first thing in the morning  - It is your responsibility to safe guard your medication  - If you develop any symptoms such as headache, changes in weight, loss of appetite, chest pain, palpitations, or change in behavior, please contact our office immediately or go to your local emergency rooms for any emergent needs.  - Your next appointment will be walk in visit.  Dr. Saba is here Monday-Thursdays, and you will need to be signed in by 3 pm in order to guarantee your prescription is filled that day.  You may be seen on Fridays or Saturdays for medication follow up as well. Discharge Instructions - ADHD Follow Up    - You will need to return to the office as instructed for follow up, or sooner if you develop symptoms.  - Medication should be taken first thing in the morning.  - It is your responsibility to safe guard your medication. Any medication that is lost or stolen may not be renewed until your next scheduled appointment.  - If you develop any symptoms such as headache, weight loss, loss of appetite, chest pain, palpitations, or change in behavior, please contact our office immediately or go to your local emergency rooms for any emergent needs.  - Your next appointment will be a walk in visit. Dr Saba is here Monday-Thursdays, and you will need to be signed in by 3 pm in order to guarantee your prescription is filled that day. You may be seen on Fridays or Saturdays for medication follow up as well, but your prescription will not be ready for  until the following Monday.

## 2023-10-20 NOTE — PROGRESS NOTES
"Chief Complaint  ADD and Hypertension    Subjective    History of Present Illness      Patient presents to [facility] for   History of Present Illness  Pt states the adderall was not approved by insurance. Pt is wanting to discuss increasing dose to 60mg. He does want to discuss mood medication for anxiety, short fused, irritable. He was taking the Nefazodone but it has been dc        Review of Systems   All other systems reviewed and are negative.      I have reviewed and agree with the HPI and ROS information as above.  Rufina Sherman, APRN     Objective   Vital Signs:   /90   Resp 20   Ht 180.3 cm (71\")   Wt 103 kg (228 lb)   BMI 31.80 kg/m²            Physical Exam  Constitutional:       Appearance: Normal appearance. He is well-developed. He is obese.   HENT:      Head: Normocephalic and atraumatic.      Right Ear: Tympanic membrane, ear canal and external ear normal.      Left Ear: Tympanic membrane, ear canal and external ear normal.      Nose: Nose normal. No septal deviation, nasal tenderness or congestion.      Mouth/Throat:      Lips: Pink. No lesions.      Mouth: Mucous membranes are moist. No oral lesions.      Dentition: Normal dentition.      Pharynx: Oropharynx is clear. No pharyngeal swelling, oropharyngeal exudate or posterior oropharyngeal erythema.   Eyes:      General: Lids are normal. Vision grossly intact. No scleral icterus.        Right eye: No discharge.         Left eye: No discharge.      Extraocular Movements: Extraocular movements intact.      Conjunctiva/sclera: Conjunctivae normal.      Right eye: Right conjunctiva is not injected.      Left eye: Left conjunctiva is not injected.      Pupils: Pupils are equal, round, and reactive to light.   Neck:      Thyroid: No thyroid mass.      Trachea: Trachea normal.   Cardiovascular:      Rate and Rhythm: Normal rate and regular rhythm.      Heart sounds: Normal heart sounds. No murmur heard.     No " gallop.   Pulmonary:      Effort: Pulmonary effort is normal.      Breath sounds: Normal breath sounds and air entry. No wheezing, rhonchi or rales.   Abdominal:      General: There is no distension.      Palpations: Abdomen is soft. There is no mass.      Tenderness: There is no abdominal tenderness. There is no right CVA tenderness, left CVA tenderness, guarding or rebound.   Musculoskeletal:         General: No tenderness or deformity. Normal range of motion.      Cervical back: Full passive range of motion without pain, normal range of motion and neck supple.      Thoracic back: Normal.      Right lower leg: No edema.      Left lower leg: No edema.   Skin:     General: Skin is warm and dry.      Coloration: Skin is not jaundiced.      Findings: No rash.   Neurological:      Mental Status: He is alert and oriented to person, place, and time.      Sensory: Sensation is intact.      Motor: Motor function is intact.      Coordination: Coordination is intact.      Gait: Gait is intact.      Deep Tendon Reflexes: Reflexes are normal and symmetric.   Psychiatric:         Mood and Affect: Mood and affect normal.         Judgment: Judgment normal.          MARCOS-7:      PHQ-2 Depression Screening  Little interest or pleasure in doing things? 0-->not at all   Feeling down, depressed, or hopeless? 0-->not at all   PHQ-2 Total Score 0     PHQ-9 Depression Screening  Little interest or pleasure in doing things? 0-->not at all   Feeling down, depressed, or hopeless? 0-->not at all   Trouble falling or staying asleep, or sleeping too much?     Feeling tired or having little energy?     Poor appetite or overeating?     Feeling bad about yourself - or that you are a failure or have let yourself or your family down?     Trouble concentrating on things, such as reading the newspaper or watching television?     Moving or speaking so slowly that other people could have noticed? Or the opposite - being so fidgety or restless that you  have been moving around a lot more than usual?     Thoughts that you would be better off dead, or of hurting yourself in some way?     PHQ-9 Total Score 0   If you checked off any problems, how difficult have these problems made it for you to do your work, take care of things at home, or get along with other people?        Result Review  Data Reviewed:                   Assessment and Plan      Diagnoses and all orders for this visit:    1. Attention deficit hyperactivity disorder (ADHD), combined type (Primary)    2. Mood disorder  -     lamoTRIgine (LaMICtal) 25 MG tablet; Take 1 tablet by mouth at bedtime for 2 weeks, then increase to 2 tablets by mouth at bedtime thereafter  Dispense: 45 tablet; Refill: 0    Patient is seen today for ADHD.  Patient states that his insurance will not cover the Adderall 10 so would like to trial the Vyvanse 60.  I did discuss with him that we can trial this to see if it gives him the longer length during the day and if not then we will switch medications.  Patient states that he was in the past on nefazodone but he is unable to get this anymore.  Patient states that he is just struggles with being short fuse, and irritable.  Patient has only been on sertraline in the past.  We will trial in the Lamictal at this    Plan:  1.  Increase Vyvanse to 60 mg.  UDS is up-to-date Francisco pending.  2.Start Lamictal 25mg working up to 50mg. Patient denies any HISI. Monitor for rash       ADHD Follow up:    PDMP reviewed and pending. ADRS (Adult ADHD Assessment Form) reviewed in detail, scanned in chart, and has been reviewed at time of appointment.  All questions, including medication and side effects, were discussed in detail at time of patient's visit. Patient will continue same medication which was discussed at today's visit. Patient is to return in 3 month(s).    Last Urine Toxicity  More data may exist         Latest Ref Rng & Units 3/20/2023 4/15/2022   LAST URINE TOXICITY RESULTS    Amphetamine, Urine Qual Negative Positive  Negative    Barbiturates Screen, Urine Negative Negative  Negative    Benzodiazepine Screen, Urine Negative Negative  Negative    Buprenorphine, Screen, Urine Negative Negative  Negative    Cocaine Screen, Urine Negative Negative  Negative    Methadone Screen , Urine Negative Negative  Negative    Methamphetamine, Ur Negative Negative  Negative         Urine Drug Screen Results: UDS RESULTS: Current results appropriate          Follow Up   Return in about 1 month (around 11/20/2023) for ADHD.  Patient was given instructions and counseling regarding his condition or for health maintenance advice. Please see specific information pulled into the AVS if appropriate.

## 2023-10-21 RX ORDER — LISDEXAMFETAMINE DIMESYLATE CAPSULES 60 MG/1
60 CAPSULE ORAL EVERY MORNING
Qty: 30 CAPSULE | Refills: 0 | Status: SHIPPED | OUTPATIENT
Start: 2023-10-21

## 2023-10-27 ENCOUNTER — TELEPHONE (OUTPATIENT)
Dept: FAMILY MEDICINE CLINIC | Facility: CLINIC | Age: 38
End: 2023-10-27
Payer: COMMERCIAL

## 2023-10-27 NOTE — TELEPHONE ENCOUNTER
Received approval for pt's vyvanse for 10/27/23-10/27/24. Sent pt [employer] message notifying.

## 2023-11-20 ENCOUNTER — OFFICE VISIT (OUTPATIENT)
Dept: FAMILY MEDICINE CLINIC | Facility: CLINIC | Age: 38
End: 2023-11-20
Payer: COMMERCIAL

## 2023-11-20 VITALS
DIASTOLIC BLOOD PRESSURE: 81 MMHG | SYSTOLIC BLOOD PRESSURE: 135 MMHG | BODY MASS INDEX: 32.76 KG/M2 | HEART RATE: 85 BPM | RESPIRATION RATE: 20 BRPM | HEIGHT: 71 IN | TEMPERATURE: 98.7 F | OXYGEN SATURATION: 98 % | WEIGHT: 234 LBS

## 2023-11-20 DIAGNOSIS — F41.8 MIXED ANXIETY AND DEPRESSIVE DISORDER: ICD-10-CM

## 2023-11-20 DIAGNOSIS — F90.2 ATTENTION DEFICIT HYPERACTIVITY DISORDER (ADHD), COMBINED TYPE: Primary | ICD-10-CM

## 2023-11-20 PROCEDURE — 99214 OFFICE O/P EST MOD 30 MIN: CPT

## 2023-11-20 RX ORDER — ESCITALOPRAM OXALATE 10 MG/1
10 TABLET ORAL DAILY
Qty: 30 TABLET | Refills: 0 | Status: SHIPPED | OUTPATIENT
Start: 2023-11-20

## 2023-11-22 DIAGNOSIS — F90.2 ATTENTION DEFICIT HYPERACTIVITY DISORDER (ADHD), COMBINED TYPE: ICD-10-CM

## 2023-11-22 NOTE — TELEPHONE ENCOUNTER
Pt last seen 11/20/23. Pt ok'd for 3 months. Pt requesting vyvanse 60mg. Routing to Dr. Saba to review and sign.   General Fall

## 2023-11-27 RX ORDER — LISDEXAMFETAMINE DIMESYLATE CAPSULES 60 MG/1
60 CAPSULE ORAL EVERY MORNING
Qty: 30 CAPSULE | Refills: 0 | Status: SHIPPED | OUTPATIENT
Start: 2023-11-27 | End: 2023-12-29

## 2023-12-20 ENCOUNTER — OFFICE VISIT (OUTPATIENT)
Dept: FAMILY MEDICINE CLINIC | Facility: CLINIC | Age: 38
End: 2023-12-20
Payer: COMMERCIAL

## 2023-12-20 VITALS
HEART RATE: 74 BPM | SYSTOLIC BLOOD PRESSURE: 132 MMHG | WEIGHT: 238 LBS | HEIGHT: 71 IN | DIASTOLIC BLOOD PRESSURE: 84 MMHG | BODY MASS INDEX: 33.32 KG/M2 | OXYGEN SATURATION: 99 % | RESPIRATION RATE: 20 BRPM | TEMPERATURE: 98.6 F

## 2023-12-20 DIAGNOSIS — I10 PRIMARY HYPERTENSION: ICD-10-CM

## 2023-12-20 DIAGNOSIS — F90.2 ATTENTION DEFICIT HYPERACTIVITY DISORDER (ADHD), COMBINED TYPE: Primary | ICD-10-CM

## 2023-12-20 DIAGNOSIS — F41.8 MIXED ANXIETY AND DEPRESSIVE DISORDER: Primary | ICD-10-CM

## 2023-12-20 DIAGNOSIS — E66.09 CLASS 1 OBESITY DUE TO EXCESS CALORIES WITH SERIOUS COMORBIDITY AND BODY MASS INDEX (BMI) OF 33.0 TO 33.9 IN ADULT: ICD-10-CM

## 2023-12-20 DIAGNOSIS — E78.1 HYPERTRIGLYCERIDEMIA: ICD-10-CM

## 2023-12-20 PROCEDURE — 99214 OFFICE O/P EST MOD 30 MIN: CPT

## 2023-12-20 RX ORDER — AMLODIPINE BESYLATE AND BENAZEPRIL HYDROCHLORIDE 5; 10 MG/1; MG/1
1 CAPSULE ORAL DAILY
Qty: 30 CAPSULE | Refills: 2 | Status: SHIPPED | OUTPATIENT
Start: 2023-12-20

## 2023-12-20 RX ORDER — ATORVASTATIN CALCIUM 10 MG/1
10 TABLET, FILM COATED ORAL DAILY
Qty: 90 TABLET | Refills: 0 | Status: SHIPPED | OUTPATIENT
Start: 2023-12-20

## 2023-12-20 RX ORDER — ESCITALOPRAM OXALATE 10 MG/1
10 TABLET ORAL DAILY
Qty: 30 TABLET | Refills: 2 | Status: SHIPPED | OUTPATIENT
Start: 2023-12-20

## 2023-12-20 RX ORDER — LISDEXAMFETAMINE DIMESYLATE CAPSULES 60 MG/1
60 CAPSULE ORAL EVERY MORNING
Qty: 30 CAPSULE | Refills: 0 | Status: SHIPPED | OUTPATIENT
Start: 2024-01-17 | End: 2024-02-16

## 2023-12-20 RX ORDER — LISDEXAMFETAMINE DIMESYLATE CAPSULES 60 MG/1
60 CAPSULE ORAL EVERY MORNING
Qty: 30 CAPSULE | Refills: 0 | Status: SHIPPED | OUTPATIENT
Start: 2023-12-20 | End: 2024-01-19

## 2023-12-20 NOTE — PROGRESS NOTES
"Chief Complaint  Anxiety, Depression, and ADHD    Subjective    History of Present Illness      Patient presents to [facility] for   History of Present Illness  Pt is here for a 1 month f/u after starting Lexapro.  Pt reports things are going well.       Review of Systems   All other systems reviewed and are negative.      I have reviewed and agree with the HPI and ROS information as above.  Rufina Sherman, DONNA     Objective   Vital Signs:   /84   Pulse 74   Temp 98.6 °F (37 °C)   Resp 20   Ht 180.3 cm (71\")   Wt 108 kg (238 lb)   SpO2 99%   BMI 33.19 kg/m²            Physical Exam  Constitutional:       Appearance: Normal appearance. He is well-developed. He is obese.   HENT:      Head: Normocephalic and atraumatic.      Right Ear: External ear normal.      Left Ear: External ear normal.      Nose: Nose normal. No nasal tenderness or congestion.      Mouth/Throat:      Lips: Pink. No lesions.      Mouth: Mucous membranes are moist. No oral lesions.      Dentition: Normal dentition.      Pharynx: Oropharynx is clear. No pharyngeal swelling, oropharyngeal exudate or posterior oropharyngeal erythema.   Eyes:      General: Lids are normal. Vision grossly intact. No scleral icterus.        Right eye: No discharge.         Left eye: No discharge.      Extraocular Movements: Extraocular movements intact.      Conjunctiva/sclera: Conjunctivae normal.      Right eye: Right conjunctiva is not injected.      Left eye: Left conjunctiva is not injected.      Pupils: Pupils are equal, round, and reactive to light.   Cardiovascular:      Rate and Rhythm: Normal rate and regular rhythm.      Heart sounds: Normal heart sounds. No murmur heard.     No gallop.   Pulmonary:      Effort: Pulmonary effort is normal.      Breath sounds: Normal breath sounds and air entry. No wheezing, rhonchi or rales.   Musculoskeletal:         General: No tenderness or deformity. Normal range of motion.      " Cervical back: Full passive range of motion without pain, normal range of motion and neck supple.      Right lower leg: No edema.      Left lower leg: No edema.   Skin:     General: Skin is warm and dry.      Coloration: Skin is not jaundiced.      Findings: No rash.   Neurological:      Mental Status: He is alert and oriented to person, place, and time.      Sensory: Sensation is intact.      Motor: Motor function is intact.      Coordination: Coordination is intact.      Gait: Gait is intact.   Psychiatric:         Attention and Perception: Attention normal.         Mood and Affect: Mood and affect normal.         Behavior: Behavior is not hyperactive. Behavior is cooperative.         Thought Content: Thought content normal.         Judgment: Judgment normal.          MARCOS-7: Over the last two weeks, how often have you been bothered by the following problems?  Feeling nervous, anxious or on edge: Not at all  Not being able to stop or control worrying: Not at all  Worrying too much about different things: Not at all  Trouble Relaxing: Not at all  Being so restless that it is hard to sit still: Not at all  Becoming easily annoyed or irritable: Not at all  Feeling afraid as if something awful might happen: Not at all  MARCOS 7 Total Score: 0  If you checked any problems, how difficult have these problems made it for you to do your work, take care of things at home, or get along with other people: Not difficult at all    PHQ-2 Depression Screening  Little interest or pleasure in doing things? 0-->not at all   Feeling down, depressed, or hopeless? 0-->not at all   PHQ-2 Total Score 0     PHQ-9 Depression Screening  Little interest or pleasure in doing things? 0-->not at all   Feeling down, depressed, or hopeless? 0-->not at all   Trouble falling or staying asleep, or sleeping too much?     Feeling tired or having little energy?     Poor appetite or overeating?     Feeling bad about yourself - or that you are a failure or  have let yourself or your family down?     Trouble concentrating on things, such as reading the newspaper or watching television?     Moving or speaking so slowly that other people could have noticed? Or the opposite - being so fidgety or restless that you have been moving around a lot more than usual?     Thoughts that you would be better off dead, or of hurting yourself in some way?     PHQ-9 Total Score 0   If you checked off any problems, how difficult have these problems made it for you to do your work, take care of things at home, or get along with other people?        Result Review  Data Reviewed:                   Assessment and Plan      Diagnoses and all orders for this visit:    1. Mixed anxiety and depressive disorder (Primary)  -     escitalopram (Lexapro) 10 MG tablet; Take 1 tablet by mouth Daily.  Dispense: 30 tablet; Refill: 2    2. Primary hypertension  Comments:  Stable, continue same. Wellness scheduled. Lipid questions answered.  Orders:  -     amLODIPine-benazepril (LOTREL 5-10) 5-10 MG per capsule; Take 1 capsule by mouth Daily.  Dispense: 30 capsule; Refill: 2    3. Hypertriglyceridemia  -     atorvastatin (Lipitor) 10 MG tablet; Take 1 tablet by mouth Daily.  Dispense: 90 tablet; Refill: 0    4. Class 1 obesity due to excess calories with serious comorbidity and body mass index (BMI) of 33.0 to 33.9 in adult    Patient is seen today for anxiety, depression, and ADHD.  Patient is doing well on current medication regimens.  He states that the addition of Lexapro has significantly helped him.  Patient wishes to continue same.    Plan:  1.  ADHD stable and well-controlled with Vyvanse 60.  We did discuss that if his pharmacy was out of this or no longer was covered we would switch him to Adderall XR.  2.  Hypertension stable with Lo-Trol.  3.  Continue Lipitor 10.  4.  Anxiety and depression stable with Lexapro.  Patient denies any HI/SI.    ADHD Follow up:    PDMP reviewed and pending. ADRS  (Adult ADHD Assessment Form) reviewed in detail, scanned in chart, and has been reviewed at time of appointment.  All questions, including medication and side effects, were discussed in detail at time of patient's visit. Patient will continue same medication which was discussed at today's visit. Patient is to return in 3 month(s).    Last Urine Toxicity  More data may exist         Latest Ref Rng & Units 3/20/2023 4/15/2022   LAST URINE TOXICITY RESULTS   Amphetamine, Urine Qual Negative Positive  Negative    Barbiturates Screen, Urine Negative Negative  Negative    Benzodiazepine Screen, Urine Negative Negative  Negative    Buprenorphine, Screen, Urine Negative Negative  Negative    Cocaine Screen, Urine Negative Negative  Negative    Methadone Screen , Urine Negative Negative  Negative    Methamphetamine, Ur Negative Negative  Negative         Urine Drug Screen Results: UDS RESULTS: Current results appropriate          Follow Up   No follow-ups on file.  Patient was given instructions and counseling regarding his condition or for health maintenance advice. Please see specific information pulled into the AVS if appropriate.

## 2024-02-22 DIAGNOSIS — F90.2 ATTENTION DEFICIT HYPERACTIVITY DISORDER (ADHD), COMBINED TYPE: ICD-10-CM

## 2024-02-22 RX ORDER — LISDEXAMFETAMINE DIMESYLATE CAPSULES 60 MG/1
60 CAPSULE ORAL EVERY MORNING
Qty: 30 CAPSULE | Refills: 0 | Status: SHIPPED | OUTPATIENT
Start: 2024-02-22 | End: 2024-03-23

## 2024-02-22 NOTE — TELEPHONE ENCOUNTER
Reviewed chart for this patient for refills since Dr. Saba is out.  I have reviewed the notes, assessments, and/ or procedures performed by the clinician.  I concur with her/his/their documentation.  Attempted to review Francisco today.  No evidence of abuse or deterrence noted at this time.  Refill authorized

## 2024-02-22 NOTE — TELEPHONE ENCOUNTER
Rx Refill Note  Requested Prescriptions     Pending Prescriptions Disp Refills    lisdexamfetamine (Vyvanse) 60 MG capsule 30 capsule 0     Sig: Take 1 capsule by mouth Every Morning for 30 days      Last office visit with prescribing clinician: Office Visit with Rufina Sherman APRN (12/20/2023)   Last telemedicine visit with prescribing clinician: Visit date not found   Next office visit with prescribing clinician: Visit date not found   {    Please add Last Relevant Lab Date if appropriate:2Urine Drug Screen - Urine, Clean Catch (03/20/2023 09:41)                    Nora Cortes RN  02/22/24, 13:21 CST

## 2024-03-20 ENCOUNTER — OFFICE VISIT (OUTPATIENT)
Dept: FAMILY MEDICINE CLINIC | Facility: CLINIC | Age: 39
End: 2024-03-20
Payer: COMMERCIAL

## 2024-03-20 ENCOUNTER — TELEPHONE (OUTPATIENT)
Dept: FAMILY MEDICINE CLINIC | Facility: CLINIC | Age: 39
End: 2024-03-20
Payer: COMMERCIAL

## 2024-03-20 ENCOUNTER — LAB (OUTPATIENT)
Dept: LAB | Facility: HOSPITAL | Age: 39
End: 2024-03-20
Payer: COMMERCIAL

## 2024-03-20 VITALS
DIASTOLIC BLOOD PRESSURE: 86 MMHG | OXYGEN SATURATION: 97 % | SYSTOLIC BLOOD PRESSURE: 128 MMHG | BODY MASS INDEX: 33.04 KG/M2 | HEIGHT: 71 IN | WEIGHT: 236 LBS | HEART RATE: 80 BPM

## 2024-03-20 DIAGNOSIS — F41.8 MIXED ANXIETY AND DEPRESSIVE DISORDER: ICD-10-CM

## 2024-03-20 DIAGNOSIS — F90.2 ATTENTION DEFICIT HYPERACTIVITY DISORDER (ADHD), COMBINED TYPE: Primary | ICD-10-CM

## 2024-03-20 DIAGNOSIS — F90.2 ATTENTION DEFICIT HYPERACTIVITY DISORDER (ADHD), COMBINED TYPE: ICD-10-CM

## 2024-03-20 DIAGNOSIS — I10 PRIMARY HYPERTENSION: ICD-10-CM

## 2024-03-20 DIAGNOSIS — E78.1 HYPERTRIGLYCERIDEMIA: ICD-10-CM

## 2024-03-20 LAB
AMPHET+METHAMPHET UR QL: POSITIVE
AMPHETAMINES UR QL: NEGATIVE
BARBITURATES UR QL SCN: NEGATIVE
BENZODIAZ UR QL SCN: NEGATIVE
BUPRENORPHINE SERPL-MCNC: NEGATIVE NG/ML
CANNABINOIDS SERPL QL: NEGATIVE
COCAINE UR QL: NEGATIVE
FENTANYL UR-MCNC: NEGATIVE NG/ML
METHADONE UR QL SCN: NEGATIVE
OPIATES UR QL: NEGATIVE
OXYCODONE UR QL SCN: NEGATIVE
PCP UR QL SCN: NEGATIVE
TRICYCLICS UR QL SCN: NEGATIVE

## 2024-03-20 PROCEDURE — 99214 OFFICE O/P EST MOD 30 MIN: CPT | Performed by: NURSE PRACTITIONER

## 2024-03-20 PROCEDURE — 80307 DRUG TEST PRSMV CHEM ANLYZR: CPT

## 2024-03-20 RX ORDER — LISDEXAMFETAMINE DIMESYLATE CAPSULES 60 MG/1
60 CAPSULE ORAL EVERY MORNING
Qty: 30 CAPSULE | Refills: 0 | Status: CANCELLED | OUTPATIENT
Start: 2024-03-20 | End: 2024-04-19

## 2024-03-20 RX ORDER — LISDEXAMFETAMINE DIMESYLATE CAPSULES 60 MG/1
60 CAPSULE ORAL EVERY MORNING
Qty: 30 CAPSULE | Refills: 0 | Status: SHIPPED | OUTPATIENT
Start: 2024-04-17 | End: 2024-05-17

## 2024-03-20 RX ORDER — AMLODIPINE BESYLATE AND BENAZEPRIL HYDROCHLORIDE 5; 10 MG/1; MG/1
1 CAPSULE ORAL DAILY
Qty: 90 CAPSULE | Refills: 1 | Status: SHIPPED | OUTPATIENT
Start: 2024-03-20

## 2024-03-20 RX ORDER — LISDEXAMFETAMINE DIMESYLATE CAPSULES 60 MG/1
60 CAPSULE ORAL EVERY MORNING
Qty: 30 CAPSULE | Refills: 0 | Status: CANCELLED | OUTPATIENT
Start: 2024-04-17 | End: 2024-05-17

## 2024-03-20 RX ORDER — ESCITALOPRAM OXALATE 10 MG/1
10 TABLET ORAL DAILY
Qty: 90 TABLET | Refills: 1 | Status: SHIPPED | OUTPATIENT
Start: 2024-03-20

## 2024-03-20 RX ORDER — LISDEXAMFETAMINE DIMESYLATE CAPSULES 60 MG/1
60 CAPSULE ORAL EVERY MORNING
Qty: 30 CAPSULE | Refills: 0 | Status: SHIPPED | OUTPATIENT
Start: 2024-03-20 | End: 2024-04-24

## 2024-03-20 RX ORDER — ATORVASTATIN CALCIUM 10 MG/1
10 TABLET, FILM COATED ORAL DAILY
Qty: 90 TABLET | Refills: 1 | Status: SHIPPED | OUTPATIENT
Start: 2024-03-20

## 2024-03-20 NOTE — PROGRESS NOTES
"Chief Complaint  Mixed Anxiety and Depression Disorder, Hyperlipidemia, Hypertension, and ADHD    Subjective    History of Present Illness      Patient presents to [facility] for   History of Present Illness  ADHD/Mood HPI    Visit for:  follow-up. Most recent visit was 3 months ago.  Interim changes to follow up on today: no change in medication  Work/School Performance:  going well  Cognitive:  able to focus    Behavior  Hyperactivity: is not hyperactive  Impulsivity: no impulsivity  Tasking: able to initiate tasks and able to complete tasks    Social  ADHD social/impulsive symptoms:  not impatient and no excessive talking    Behavioral health  Behavior: no concerns  Emotional coping: demonstrates feelings of no concerns    Appetite has increased while taking Vyvanse,  this began over the last couple of months.        Review of Systems    I have reviewed and agree with the HPI information as above.  Sophia Tucker, APRN     Objective   Vital Signs:   /86   Pulse 80   Ht 180.3 cm (71\")   Wt 107 kg (236 lb)   SpO2 97%   BMI 32.92 kg/m²            Physical Exam  Vitals and nursing note reviewed.   Constitutional:       Appearance: Normal appearance. He is well-developed.   HENT:      Head: Normocephalic and atraumatic.      Right Ear: Tympanic membrane, ear canal and external ear normal.      Left Ear: Tympanic membrane, ear canal and external ear normal.      Nose: Nose normal. No septal deviation, nasal tenderness or congestion.      Mouth/Throat:      Lips: Pink. No lesions.      Mouth: Mucous membranes are moist. No oral lesions.      Dentition: Normal dentition.      Pharynx: Oropharynx is clear. No pharyngeal swelling, oropharyngeal exudate or posterior oropharyngeal erythema.   Eyes:      General: Lids are normal. Vision grossly intact. No scleral icterus.        Right eye: No discharge.         Left eye: No discharge.      Extraocular Movements: Extraocular " movements intact.      Conjunctiva/sclera: Conjunctivae normal.      Right eye: Right conjunctiva is not injected.      Left eye: Left conjunctiva is not injected.      Pupils: Pupils are equal, round, and reactive to light.   Neck:      Thyroid: No thyroid mass.      Trachea: Trachea normal.   Cardiovascular:      Rate and Rhythm: Normal rate and regular rhythm.      Heart sounds: Normal heart sounds. No murmur heard.     No gallop.   Pulmonary:      Effort: Pulmonary effort is normal.      Breath sounds: Normal breath sounds and air entry. No wheezing, rhonchi or rales.   Musculoskeletal:         General: No tenderness or deformity. Normal range of motion.      Cervical back: Full passive range of motion without pain, normal range of motion and neck supple.      Thoracic back: Normal.      Right lower leg: No edema.      Left lower leg: No edema.   Skin:     General: Skin is warm and dry.      Coloration: Skin is not jaundiced.      Findings: No rash.   Neurological:      Mental Status: He is alert and oriented to person, place, and time.      Sensory: Sensation is intact.      Motor: Motor function is intact.      Coordination: Coordination is intact.      Gait: Gait is intact.      Deep Tendon Reflexes: Reflexes are normal and symmetric.   Psychiatric:         Mood and Affect: Mood and affect normal.         Behavior: Behavior normal.         Judgment: Judgment normal.             Result Review  Data Reviewed:                   Assessment and Plan      Diagnoses and all orders for this visit:    1. Attention deficit hyperactivity disorder (ADHD), combined type (Primary)  -     Urine Drug Screen - Urine, Clean Catch; Future    2. Primary hypertension  Comments:  Stable, continue same. Wellness scheduled. Lipid questions answered.  Orders:  -     amLODIPine-benazepril (LOTREL 5-10) 5-10 MG per capsule; Take 1 capsule by mouth Daily.  Dispense: 90 capsule; Refill: 1  -     CBC Auto Differential; Future  -      Comprehensive Metabolic Panel; Future  -     Lipid Panel; Future    3. Hypertriglyceridemia  -     atorvastatin (Lipitor) 10 MG tablet; Take 1 tablet by mouth Daily.  Dispense: 90 tablet; Refill: 1  -     CBC Auto Differential; Future  -     Comprehensive Metabolic Panel; Future  -     Lipid Panel; Future    4. Mixed anxiety and depressive disorder  -     escitalopram (Lexapro) 10 MG tablet; Take 1 tablet by mouth Daily.  Dispense: 90 tablet; Refill: 1    Patient is here today for a medication follow-up.  He states that he is doing well on his medications.  Urine drug screen is due today for his ADHD medications.  He is also due for lab work to check his cholesterol.  ADHD Follow up:    PDMP reviewed and is clean. ADRS (Adult ADHD Assessment Form) reviewed in detail, scanned in chart, and has been reviewed at time of appointment.  All questions, including medication and side effects, were discussed in detail at time of patient's visit. Patient will continue same medication which was discussed at today's visit. Patient is to return in 3 month(s).    Last Urine Toxicity  More data may exist         Latest Ref Rng & Units 3/20/2023 4/15/2022   LAST URINE TOXICITY RESULTS   Amphetamine, Urine Qual Negative Positive  Negative    Barbiturates Screen, Urine Negative Negative  Negative    Benzodiazepine Screen, Urine Negative Negative  Negative    Buprenorphine, Screen, Urine Negative Negative  Negative    Cocaine Screen, Urine Negative Negative  Negative    Methadone Screen , Urine Negative Negative  Negative    Methamphetamine, Ur Negative Negative  Negative         Urine Drug Screen Results: UDS RESULTS: Current results appropriate    CSA completed on: 7/21/2023          Follow Up   Return in about 3 months (around 6/20/2024) for ADHD follow up, Recheck.  Patient was given instructions and counseling regarding his condition or for health maintenance advice. Please see specific information pulled into the AVS if  appropriate.       Answers submitted by the patient for this visit:  Primary Reason for Visit (Submitted on 3/13/2024)  What is the primary reason for your visit?: Other  Other (Submitted on 3/13/2024)  Please describe your symptoms.: Adhd medication checkup  Have you had these symptoms before?: Yes  How long have you been having these symptoms?: Greater than 2 weeks

## 2024-03-20 NOTE — TELEPHONE ENCOUNTER
Sent pt [email or web address] message relaying below    HUB TO RELAY  Your urine drug screen was appropriate. Kellie sent your prescriptions to Dr. Saba to sign. They should be at your pharmacy by the end of the day. Please let me know if you have any questions.

## 2024-03-20 NOTE — TELEPHONE ENCOUNTER
----- Message from DONNA Diaz sent at 3/20/2024  9:46 AM CDT -----  UDS is appropriate. Medications have been routed to Dr Saba to sign.

## 2024-03-24 LAB
ALBUMIN SERPL-MCNC: 4.5 G/DL (ref 3.5–5.2)
ALBUMIN/GLOB SERPL: 1.7 G/DL
ALP SERPL-CCNC: 82 U/L (ref 39–117)
ALT SERPL W P-5'-P-CCNC: 33 U/L (ref 1–41)
ANION GAP SERPL CALCULATED.3IONS-SCNC: 9 MMOL/L (ref 5–15)
AST SERPL-CCNC: 21 U/L (ref 1–40)
BASOPHILS # BLD AUTO: 0.04 10*3/MM3 (ref 0–0.2)
BASOPHILS NFR BLD AUTO: 0.6 % (ref 0–1.5)
BILIRUB SERPL-MCNC: 0.5 MG/DL (ref 0–1.2)
BUN SERPL-MCNC: 11 MG/DL (ref 6–20)
BUN/CREAT SERPL: 13.4 (ref 7–25)
CALCIUM SPEC-SCNC: 9.2 MG/DL (ref 8.6–10.5)
CHLORIDE SERPL-SCNC: 104 MMOL/L (ref 98–107)
CHOLEST SERPL-MCNC: 144 MG/DL (ref 0–200)
CO2 SERPL-SCNC: 28 MMOL/L (ref 22–29)
CREAT SERPL-MCNC: 0.82 MG/DL (ref 0.76–1.27)
DEPRECATED RDW RBC AUTO: 39.7 FL (ref 37–54)
EGFRCR SERPLBLD CKD-EPI 2021: 114.6 ML/MIN/1.73
EOSINOPHIL # BLD AUTO: 0.15 10*3/MM3 (ref 0–0.4)
EOSINOPHIL NFR BLD AUTO: 2.1 % (ref 0.3–6.2)
ERYTHROCYTE [DISTWIDTH] IN BLOOD BY AUTOMATED COUNT: 13.1 % (ref 12.3–15.4)
GLOBULIN UR ELPH-MCNC: 2.6 GM/DL
GLUCOSE SERPL-MCNC: 90 MG/DL (ref 65–99)
HCT VFR BLD AUTO: 39.3 % (ref 37.5–51)
HDLC SERPL-MCNC: 31 MG/DL (ref 40–60)
HGB BLD-MCNC: 13.5 G/DL (ref 13–17.7)
IMM GRANULOCYTES # BLD AUTO: 0.01 10*3/MM3 (ref 0–0.05)
IMM GRANULOCYTES NFR BLD AUTO: 0.1 % (ref 0–0.5)
LDLC SERPL CALC-MCNC: 81 MG/DL (ref 0–100)
LDLC/HDLC SERPL: 2.43 {RATIO}
LYMPHOCYTES # BLD AUTO: 3.12 10*3/MM3 (ref 0.7–3.1)
LYMPHOCYTES NFR BLD AUTO: 43 % (ref 19.6–45.3)
MCH RBC QN AUTO: 28.8 PG (ref 26.6–33)
MCHC RBC AUTO-ENTMCNC: 34.4 G/DL (ref 31.5–35.7)
MCV RBC AUTO: 84 FL (ref 79–97)
MONOCYTES # BLD AUTO: 0.74 10*3/MM3 (ref 0.1–0.9)
MONOCYTES NFR BLD AUTO: 10.2 % (ref 5–12)
NEUTROPHILS NFR BLD AUTO: 3.19 10*3/MM3 (ref 1.7–7)
NEUTROPHILS NFR BLD AUTO: 44 % (ref 42.7–76)
NRBC BLD AUTO-RTO: 0 /100 WBC (ref 0–0.2)
PLATELET # BLD AUTO: 249 10*3/MM3 (ref 140–450)
PMV BLD AUTO: 10.7 FL (ref 6–12)
POTASSIUM SERPL-SCNC: 4.3 MMOL/L (ref 3.5–5.2)
PROT SERPL-MCNC: 7.1 G/DL (ref 6–8.5)
RBC # BLD AUTO: 4.68 10*6/MM3 (ref 4.14–5.8)
SODIUM SERPL-SCNC: 141 MMOL/L (ref 136–145)
TRIGL SERPL-MCNC: 188 MG/DL (ref 0–150)
VLDLC SERPL-MCNC: 32 MG/DL (ref 5–40)
WBC NRBC COR # BLD AUTO: 7.25 10*3/MM3 (ref 3.4–10.8)

## 2024-03-24 PROCEDURE — 80061 LIPID PANEL: CPT | Performed by: NURSE PRACTITIONER

## 2024-03-24 PROCEDURE — 85025 COMPLETE CBC W/AUTO DIFF WBC: CPT | Performed by: NURSE PRACTITIONER

## 2024-03-24 PROCEDURE — 36415 COLL VENOUS BLD VENIPUNCTURE: CPT | Performed by: NURSE PRACTITIONER

## 2024-03-24 PROCEDURE — 80053 COMPREHEN METABOLIC PANEL: CPT | Performed by: NURSE PRACTITIONER

## 2024-03-26 ENCOUNTER — TELEPHONE (OUTPATIENT)
Dept: FAMILY MEDICINE CLINIC | Facility: CLINIC | Age: 39
End: 2024-03-26
Payer: COMMERCIAL

## 2024-03-26 NOTE — TELEPHONE ENCOUNTER
----- Message from DONNA Diaz sent at 3/26/2024  9:44 AM CDT -----  Total cholesterol is fine, trigs mildly elevated- no med changes needed  Cbc normal  Cmp normal

## 2024-03-26 NOTE — TELEPHONE ENCOUNTER
Sent pt [facility] message relaying below    HUB TO RELAY  Your total cholesterol was fine, but your triglycerides were mildly elevated. Kellie wants you to continue with your current medication regimen.  The rest of your labs were normal. Please let me know if you have any questions.

## 2024-05-21 DIAGNOSIS — F90.2 ATTENTION DEFICIT HYPERACTIVITY DISORDER (ADHD), COMBINED TYPE: ICD-10-CM

## 2024-05-21 RX ORDER — LISDEXAMFETAMINE DIMESYLATE 60 MG/1
60 CAPSULE ORAL EVERY MORNING
Qty: 30 CAPSULE | Refills: 0 | Status: CANCELLED | OUTPATIENT
Start: 2024-05-21 | End: 2024-06-20

## 2024-05-21 RX ORDER — LISDEXAMFETAMINE DIMESYLATE 60 MG/1
60 CAPSULE ORAL EVERY MORNING
Qty: 30 CAPSULE | Refills: 0 | Status: SHIPPED | OUTPATIENT
Start: 2024-05-21 | End: 2024-06-21

## 2024-05-21 NOTE — TELEPHONE ENCOUNTER
Pt last seen 3/20/24. Pt ok'd for 3 months.  Refill: 3rd  Date of last script: 4/17/24  UDS 3/20/24  Routing to Dr. Saba to review.

## 2024-06-21 DIAGNOSIS — F90.2 ATTENTION DEFICIT HYPERACTIVITY DISORDER (ADHD), COMBINED TYPE: ICD-10-CM

## 2024-06-21 RX ORDER — LISDEXAMFETAMINE DIMESYLATE 60 MG/1
60 CAPSULE ORAL EVERY MORNING
Qty: 30 CAPSULE | Refills: 0 | OUTPATIENT
Start: 2024-06-21 | End: 2024-07-21

## 2024-06-21 RX ORDER — LISDEXAMFETAMINE DIMESYLATE 60 MG/1
60 CAPSULE ORAL EVERY MORNING
Qty: 30 CAPSULE | Refills: 0 | Status: CANCELLED | OUTPATIENT
Start: 2024-06-21 | End: 2024-07-21

## 2024-06-24 ENCOUNTER — OFFICE VISIT (OUTPATIENT)
Dept: FAMILY MEDICINE CLINIC | Facility: CLINIC | Age: 39
End: 2024-06-24
Payer: COMMERCIAL

## 2024-06-24 ENCOUNTER — TELEPHONE (OUTPATIENT)
Dept: FAMILY MEDICINE CLINIC | Facility: CLINIC | Age: 39
End: 2024-06-24
Payer: COMMERCIAL

## 2024-06-24 ENCOUNTER — HOSPITAL ENCOUNTER (OUTPATIENT)
Dept: GENERAL RADIOLOGY | Facility: HOSPITAL | Age: 39
Discharge: HOME | End: 2024-06-24
Payer: COMMERCIAL

## 2024-06-24 VITALS
WEIGHT: 239.2 LBS | OXYGEN SATURATION: 98 % | RESPIRATION RATE: 20 BRPM | HEART RATE: 76 BPM | SYSTOLIC BLOOD PRESSURE: 123 MMHG | HEIGHT: 71 IN | DIASTOLIC BLOOD PRESSURE: 81 MMHG | BODY MASS INDEX: 33.49 KG/M2

## 2024-06-24 DIAGNOSIS — M25.612 DECREASED ROM OF LEFT SHOULDER: ICD-10-CM

## 2024-06-24 DIAGNOSIS — F90.2 ATTENTION DEFICIT HYPERACTIVITY DISORDER (ADHD), COMBINED TYPE: Primary | ICD-10-CM

## 2024-06-24 DIAGNOSIS — M79.622 LEFT UPPER ARM PAIN: ICD-10-CM

## 2024-06-24 DIAGNOSIS — F39 MOOD DISORDER: ICD-10-CM

## 2024-06-24 DIAGNOSIS — M54.2 NECK PAIN: ICD-10-CM

## 2024-06-24 DIAGNOSIS — M62.838 MUSCLE SPASMS OF NECK: Primary | ICD-10-CM

## 2024-06-24 DIAGNOSIS — E66.09 CLASS 1 OBESITY DUE TO EXCESS CALORIES WITH SERIOUS COMORBIDITY AND BODY MASS INDEX (BMI) OF 33.0 TO 33.9 IN ADULT: ICD-10-CM

## 2024-06-24 DIAGNOSIS — F95.2 GILLES DE LA TOURETTE'S SYNDROME: ICD-10-CM

## 2024-06-24 PROCEDURE — 72050 X-RAY EXAM NECK SPINE 4/5VWS: CPT

## 2024-06-24 PROCEDURE — 99214 OFFICE O/P EST MOD 30 MIN: CPT

## 2024-06-24 PROCEDURE — 73030 X-RAY EXAM OF SHOULDER: CPT

## 2024-06-24 RX ORDER — LISDEXAMFETAMINE DIMESYLATE 60 MG/1
60 CAPSULE ORAL EVERY MORNING
Qty: 30 CAPSULE | Refills: 0 | Status: SHIPPED | OUTPATIENT
Start: 2024-07-22 | End: 2024-08-21

## 2024-06-24 RX ORDER — METHYLPREDNISOLONE 4 MG/1
TABLET ORAL
Qty: 21 TABLET | Refills: 0 | Status: SHIPPED | OUTPATIENT
Start: 2024-06-24

## 2024-06-24 RX ORDER — CYCLOBENZAPRINE HCL 10 MG
10 TABLET ORAL NIGHTLY
Qty: 30 TABLET | Refills: 0 | Status: SHIPPED | OUTPATIENT
Start: 2024-06-24

## 2024-06-24 RX ORDER — LISDEXAMFETAMINE DIMESYLATE 60 MG/1
60 CAPSULE ORAL EVERY MORNING
Qty: 30 CAPSULE | Refills: 0 | Status: SHIPPED | OUTPATIENT
Start: 2024-06-24 | End: 2024-07-25

## 2024-06-24 RX ORDER — LAMOTRIGINE 25 MG/1
TABLET ORAL
Qty: 45 TABLET | Refills: 0 | Status: SHIPPED | OUTPATIENT
Start: 2024-06-24

## 2024-06-24 NOTE — TELEPHONE ENCOUNTER
Left voicemail for call back. Also sent results via [employer] TO RELAY  I tried to reach you by phone but was unsuccessful.     The x-ray of your neck showed some minimal spondylosis with bone spurring and straightening. This could indicate muscle spasms. Rufina can send you in a steroid pack and muscle relaxer if you'd like.  Your shoulder x-ray was unremarkable. She sent a referral to [facility]; they may want to proceed with an MRI. Either way, [facility] will call you to set up an appointment. Please let me know if you have any questions.

## 2024-06-24 NOTE — PATIENT INSTRUCTIONS
Discharge Instructions - Mood Disorder Follow Up     - You will need to return to the office as instructed for follow up, or sooner if you develop symptoms  - Medication should be taken first thing in the morning  - It is your responsibility to safe guard your medication  - If you develop any symptoms such as headache, changes in weight, loss of appetite, chest pain, palpitations, or change in behavior, please contact our office immediately or go to your local emergency rooms for any emergent needs.  - Your next appointment will be walk in visit.  Dr. Saba is here Monday-Thursdays, and you will need to be signed in by 3 pm in order to guarantee your prescription is filled that day.  You may be seen on Fridays or Saturdays for medication follow up as well.

## 2024-06-24 NOTE — PROGRESS NOTES
"Chief Complaint  ADHD and Hypertension    Subjective    History of Present Illness      Patient presents to [facility] for   History of Present Illness  Pt here for 3 month f/u. Pt last seen 3/20/24. Pt reports vyvanse hasn't been working as well. Pt is wanting to try changing his diet before discussing increasing dosage. Pt reports that lexapro is making him gain weight.       Review of Systems   All other systems reviewed and are negative.      I have reviewed and agree with the HPI and ROS information as above.  DONNA Spring     Objective   Vital Signs:   /81   Pulse 76   Resp 20   Ht 180.3 cm (71\")   Wt 109 kg (239 lb 3.2 oz)   SpO2 98%   BMI 33.36 kg/m²     BMI is >= 30 and <35. (Class 1 Obesity). The following options were offered after discussion;: exercise counseling/recommendations, nutrition counseling/recommendations, and pharmacological intervention options      Physical Exam  Constitutional:       Appearance: Normal appearance. He is well-developed.   HENT:      Head: Normocephalic and atraumatic.      Right Ear: Tympanic membrane, ear canal and external ear normal.      Left Ear: Tympanic membrane, ear canal and external ear normal.      Nose: Nose normal. No septal deviation, nasal tenderness or congestion.      Mouth/Throat:      Lips: Pink. No lesions.      Mouth: Mucous membranes are moist. No oral lesions.      Dentition: Normal dentition.      Pharynx: Oropharynx is clear. No pharyngeal swelling, oropharyngeal exudate or posterior oropharyngeal erythema.   Eyes:      General: Lids are normal. Vision grossly intact. No scleral icterus.        Right eye: No discharge.         Left eye: No discharge.      Extraocular Movements: Extraocular movements intact.      Conjunctiva/sclera: Conjunctivae normal.      Right eye: Right conjunctiva is not injected.      Left eye: Left conjunctiva is not injected.      Pupils: Pupils are equal, round, and " reactive to light.   Neck:      Thyroid: No thyroid mass.      Trachea: Trachea normal.   Cardiovascular:      Rate and Rhythm: Normal rate and regular rhythm.      Heart sounds: Normal heart sounds. No murmur heard.     No gallop.   Pulmonary:      Effort: Pulmonary effort is normal.      Breath sounds: Normal breath sounds and air entry. No wheezing, rhonchi or rales.   Abdominal:      General: There is no distension.      Palpations: Abdomen is soft. There is no mass.      Tenderness: There is no abdominal tenderness. There is no right CVA tenderness, left CVA tenderness, guarding or rebound.   Musculoskeletal:         General: No tenderness or deformity. Normal range of motion.      Cervical back: Full passive range of motion without pain, normal range of motion and neck supple.      Thoracic back: Normal.      Right lower leg: No edema.      Left lower leg: No edema.   Skin:     General: Skin is warm and dry.      Coloration: Skin is not jaundiced.      Findings: No rash.   Neurological:      Mental Status: He is alert and oriented to person, place, and time.      Sensory: Sensation is intact.      Motor: Motor function is intact.      Coordination: Coordination is intact.      Gait: Gait is intact.      Deep Tendon Reflexes: Reflexes are normal and symmetric.      Comments: Facial tics noted    Psychiatric:         Mood and Affect: Mood and affect normal.         Judgment: Judgment normal.          MARCOS-7:      PHQ-2 Depression Screening  Little interest or pleasure in doing things? 0-->not at all   Feeling down, depressed, or hopeless? 0-->not at all   PHQ-2 Total Score 0     PHQ-9 Depression Screening  Little interest or pleasure in doing things? 0-->not at all   Feeling down, depressed, or hopeless? 0-->not at all   Trouble falling or staying asleep, or sleeping too much?     Feeling tired or having little energy?     Poor appetite or overeating?     Feeling bad about yourself - or that you are a failure or  have let yourself or your family down?     Trouble concentrating on things, such as reading the newspaper or watching television?     Moving or speaking so slowly that other people could have noticed? Or the opposite - being so fidgety or restless that you have been moving around a lot more than usual?     Thoughts that you would be better off dead, or of hurting yourself in some way?     PHQ-9 Total Score 0   If you checked off any problems, how difficult have these problems made it for you to do your work, take care of things at home, or get along with other people?        Result Review  Data Reviewed:                   Assessment and Plan      Diagnoses and all orders for this visit:    1. Attention deficit hyperactivity disorder (ADHD), combined type (Primary)    2. Mood disorder  -     lamoTRIgine (LaMICtal) 25 MG tablet; Take 1 tablet by mouth at bedtime for 2 weeks and then increase to 2 tablets at bedtime thereafter.  Dispense: 45 tablet; Refill: 0    3. Left upper arm pain  -     Ambulatory Referral to Orthopedic Surgery  -     XR Shoulder 2+ View Left; Future    4. Decreased ROM of left shoulder  -     Ambulatory Referral to Orthopedic Surgery  -     XR Shoulder 2+ View Left; Future    5. Neck pain  -     XR Spine Cervical Complete 4 or 5 View; Future    6. Morgan de la Tourette's syndrome    7. Class 1 obesity due to excess calories with serious comorbidity and body mass index (BMI) of 33.0 to 33.9 in adult    Patient is seen today for ADHD follow-up.  Patient states that he is doing well on his Vyvanse.  Patient states he does want to come off of the Lexapro.  He states it is causing him to gain weight and constantly eat.  He states that he also does not feel like his Vyvanse is working as well due to his excessive amount of eating along with his increase in sugar intake.  He states that since he has had a poor diet and eating more sugar his tics have become worse.  Patient would like to come off this  med at this time.  Patient states he did not have this issue before the Lexapro.  I discussed with him that we will wean this.  He states that he was just needing a medication in order to help for short fuse and.  I discussed with him I felt he would be a good candidate for Lamictal.  We will trial this at this time.    Patient also complains of left shoulder and arm pain.  He states this has been ongoing for about 3 months.  He states that he feels like since his sugars have made his tics worse this is caused him to have increased soreness in his neck, and left shoulder.  Patient usually goes to [facility] he states but due to his new insurance he is required to have a referral.  I discussed with him that we can do x-rays on this at this time and put in a referral to [facility].  Patient would like to do this.    Plan:  1.  ADHD stable and well-controlled with Vyvanse 60.  UDS is up-to-date [place] pending.  2.  Start Lamictal 25 mg working up to 50 mg.  Medication education and side effects discussed.  Patient denies any HI/SI.  Educated to monitor for rash.  3.  Wean Lexapro.  Weaning process discussed at office.  4.  X-ray shoulder and C-Spine  5. Referral to [facility]           Follow Up   Return in about 3 months (around 9/24/2024) for ADHD.  Patient was given instructions and counseling regarding his condition or for health maintenance advice. Please see specific information pulled into the AVS if appropriate.

## 2024-06-24 NOTE — TELEPHONE ENCOUNTER
----- Message from Rufina Hummelher sent at 6/24/2024  1:32 PM CDT -----  Please let patient know that he does have minimal spondylosis with spurring and straightening which could indicate muscle spasm. I do not mind to send him in a steroid handy and muscle relaxer to see if this helps if he would like

## 2024-07-25 DIAGNOSIS — F39 MOOD DISORDER: ICD-10-CM

## 2024-07-25 DIAGNOSIS — F90.2 ATTENTION DEFICIT HYPERACTIVITY DISORDER (ADHD), COMBINED TYPE: ICD-10-CM

## 2024-07-25 RX ORDER — LISDEXAMFETAMINE DIMESYLATE 60 MG/1
60 CAPSULE ORAL EVERY MORNING
Qty: 30 CAPSULE | Refills: 0 | OUTPATIENT
Start: 2024-07-25 | End: 2024-08-24

## 2024-07-25 RX ORDER — LISDEXAMFETAMINE DIMESYLATE 60 MG/1
60 CAPSULE ORAL EVERY MORNING
Qty: 30 CAPSULE | Refills: 0 | Status: CANCELLED | OUTPATIENT
Start: 2024-07-25 | End: 2024-08-24

## 2024-07-25 NOTE — TELEPHONE ENCOUNTER
Rx Refill Note  Requested Prescriptions     Pending Prescriptions Disp Refills    lamoTRIgine (LaMICtal) 25 MG tablet 45 tablet 0     Sig: Take 1 tablet by mouth at bedtime for 2 weeks and then increase to 2 tablets at bedtime thereafter.      Last office visit with prescribing clinician: 6/24/2024   Last telemedicine visit with prescribing clinician: Visit date not found   Next office visit with prescribing clinician: 9/23/2024     Office Visit with Rufina Sherman APRN (06/24/2024)                       Asiya Xavier MA  07/25/24, 10:00 CDT

## 2024-07-29 RX ORDER — LAMOTRIGINE 25 MG/1
TABLET ORAL
Qty: 45 TABLET | Refills: 0 | Status: SHIPPED | OUTPATIENT
Start: 2024-07-29

## 2024-08-26 DIAGNOSIS — F90.2 ATTENTION DEFICIT HYPERACTIVITY DISORDER (ADHD), COMBINED TYPE: ICD-10-CM

## 2024-08-26 RX ORDER — LISDEXAMFETAMINE DIMESYLATE 60 MG/1
60 CAPSULE ORAL EVERY MORNING
Qty: 30 CAPSULE | Refills: 0 | Status: SHIPPED | OUTPATIENT
Start: 2024-09-25 | End: 2024-10-25

## 2024-08-26 RX ORDER — LISDEXAMFETAMINE DIMESYLATE 60 MG/1
60 CAPSULE ORAL EVERY MORNING
Qty: 30 CAPSULE | Refills: 0 | Status: SHIPPED | OUTPATIENT
Start: 2024-08-26 | End: 2024-09-26

## 2024-08-26 RX ORDER — LISDEXAMFETAMINE DIMESYLATE 60 MG/1
60 CAPSULE ORAL EVERY MORNING
Qty: 30 CAPSULE | Refills: 0 | Status: SHIPPED | OUTPATIENT
Start: 2024-10-25 | End: 2024-11-24

## 2024-08-26 NOTE — TELEPHONE ENCOUNTER
Rx Refill Note  Requested Prescriptions     Pending Prescriptions Disp Refills    lisdexamfetamine (Vyvanse) 60 MG capsule 30 capsule 0     Sig: Take 1 capsule by mouth Every Morning    lisdexamfetamine (Vyvanse) 60 MG capsule 30 capsule 0     Sig: Take 1 capsule by mouth Every Morning for 30 days    lisdexamfetamine (Vyvanse) 60 MG capsule 30 capsule 0     Sig: Take 1 capsule by mouth Every Morning for 30 days      Last office visit with prescribing clinician: Visit date not found   Last telemedicine visit with prescribing clinician: Visit date not found   Next office visit with prescribing clinician: Visit date not found     Csa- 06-24-24  Uds - 03-20-24          Kelsey Dao MA  08/26/24, 10:45 CDT

## 2024-09-17 ENCOUNTER — TELEPHONE (OUTPATIENT)
Dept: FAMILY MEDICINE CLINIC | Facility: CLINIC | Age: 39
End: 2024-09-17
Payer: COMMERCIAL

## 2024-09-25 DIAGNOSIS — F90.2 ATTENTION DEFICIT HYPERACTIVITY DISORDER (ADHD), COMBINED TYPE: ICD-10-CM

## 2024-09-25 DIAGNOSIS — I10 PRIMARY HYPERTENSION: ICD-10-CM

## 2024-09-25 RX ORDER — LISDEXAMFETAMINE DIMESYLATE 60 MG/1
60 CAPSULE ORAL EVERY MORNING
Qty: 30 CAPSULE | Refills: 0 | Status: SHIPPED | OUTPATIENT
Start: 2024-09-25 | End: 2024-10-25

## 2024-09-27 RX ORDER — AMLODIPINE AND BENAZEPRIL HYDROCHLORIDE 5; 10 MG/1; MG/1
1 CAPSULE ORAL DAILY
Qty: 30 CAPSULE | Refills: 0 | Status: SHIPPED | OUTPATIENT
Start: 2024-09-27

## 2024-10-02 ENCOUNTER — OFFICE VISIT (OUTPATIENT)
Dept: FAMILY MEDICINE CLINIC | Facility: CLINIC | Age: 39
End: 2024-10-02
Payer: COMMERCIAL

## 2024-10-02 ENCOUNTER — LAB (OUTPATIENT)
Dept: LAB | Facility: HOSPITAL | Age: 39
End: 2024-10-02
Payer: COMMERCIAL

## 2024-10-02 VITALS
RESPIRATION RATE: 18 BRPM | DIASTOLIC BLOOD PRESSURE: 84 MMHG | HEART RATE: 76 BPM | SYSTOLIC BLOOD PRESSURE: 113 MMHG | WEIGHT: 241.8 LBS | HEIGHT: 71 IN | BODY MASS INDEX: 33.85 KG/M2 | OXYGEN SATURATION: 97 %

## 2024-10-02 DIAGNOSIS — M62.838 MUSCLE SPASMS OF NECK: ICD-10-CM

## 2024-10-02 DIAGNOSIS — E78.1 HYPERTRIGLYCERIDEMIA: ICD-10-CM

## 2024-10-02 DIAGNOSIS — F90.2 ATTENTION DEFICIT HYPERACTIVITY DISORDER (ADHD), COMBINED TYPE: Primary | ICD-10-CM

## 2024-10-02 DIAGNOSIS — Z00.00 ENCOUNTER FOR ANNUAL PHYSICAL EXAM: Primary | ICD-10-CM

## 2024-10-02 DIAGNOSIS — Z00.00 ENCOUNTER FOR ANNUAL PHYSICAL EXAM: ICD-10-CM

## 2024-10-02 DIAGNOSIS — E88.82 CLASS 1 OBESITY DUE TO DISRUPTION OF MC4R PATHWAY WITH SERIOUS COMORBIDITY AND BODY MASS INDEX (BMI) OF 33.0 TO 33.9 IN ADULT: ICD-10-CM

## 2024-10-02 DIAGNOSIS — F39 MOOD DISORDER: ICD-10-CM

## 2024-10-02 DIAGNOSIS — E66.811 CLASS 1 OBESITY DUE TO DISRUPTION OF MC4R PATHWAY WITH SERIOUS COMORBIDITY AND BODY MASS INDEX (BMI) OF 33.0 TO 33.9 IN ADULT: ICD-10-CM

## 2024-10-02 DIAGNOSIS — F95.2 GILLES DE LA TOURETTE'S SYNDROME: ICD-10-CM

## 2024-10-02 DIAGNOSIS — M25.512 ACUTE PAIN OF LEFT SHOULDER: ICD-10-CM

## 2024-10-02 DIAGNOSIS — F41.8 MIXED ANXIETY AND DEPRESSIVE DISORDER: ICD-10-CM

## 2024-10-02 DIAGNOSIS — F90.2 ATTENTION DEFICIT HYPERACTIVITY DISORDER (ADHD), COMBINED TYPE: ICD-10-CM

## 2024-10-02 DIAGNOSIS — Z15.2 CLASS 1 OBESITY DUE TO DISRUPTION OF MC4R PATHWAY WITH SERIOUS COMORBIDITY AND BODY MASS INDEX (BMI) OF 33.0 TO 33.9 IN ADULT: ICD-10-CM

## 2024-10-02 DIAGNOSIS — I10 PRIMARY HYPERTENSION: ICD-10-CM

## 2024-10-02 DIAGNOSIS — M25.612 DECREASED ROM OF LEFT SHOULDER: ICD-10-CM

## 2024-10-02 LAB
ALBUMIN SERPL-MCNC: 4.7 G/DL (ref 3.5–5)
ALBUMIN/GLOB SERPL: 1.7 G/DL (ref 1.1–2.5)
ALP SERPL-CCNC: 81 U/L (ref 24–120)
ALT SERPL W P-5'-P-CCNC: 66 U/L (ref 0–50)
AMPHET+METHAMPHET UR QL: POSITIVE
AMPHETAMINES UR QL: NEGATIVE
ANION GAP SERPL CALCULATED.3IONS-SCNC: 16 MMOL/L (ref 4–13)
AST SERPL-CCNC: 42 U/L (ref 7–45)
AUTO MIXED CELLS #: 0.6 10*3/MM3 (ref 0.1–2.6)
AUTO MIXED CELLS %: 9.7 % (ref 0.1–24)
BARBITURATES UR QL SCN: NEGATIVE
BENZODIAZ UR QL SCN: NEGATIVE
BILIRUB SERPL-MCNC: 0.8 MG/DL (ref 0.1–1)
BILIRUB UR QL STRIP: NEGATIVE
BUN SERPL-MCNC: 11 MG/DL (ref 5–21)
BUN/CREAT SERPL: 11.6
BUPRENORPHINE SERPL-MCNC: NEGATIVE NG/ML
CALCIUM SPEC-SCNC: 9.3 MG/DL (ref 8.6–10.5)
CANNABINOIDS SERPL QL: NEGATIVE
CHLORIDE SERPL-SCNC: 100 MMOL/L (ref 98–110)
CHOLEST SERPL-MCNC: 182 MG/DL (ref 130–200)
CLARITY UR: CLEAR
CO2 SERPL-SCNC: 25 MMOL/L (ref 24–31)
COCAINE UR QL: NEGATIVE
COLOR UR: YELLOW
CREAT SERPL-MCNC: 0.95 MG/DL (ref 0.5–1.4)
EGFRCR SERPLBLD CKD-EPI 2021: 103.8 ML/MIN/1.73
ERYTHROCYTE [DISTWIDTH] IN BLOOD BY AUTOMATED COUNT: 13.1 % (ref 12.3–15.4)
FENTANYL UR-MCNC: NEGATIVE NG/ML
GLOBULIN UR ELPH-MCNC: 2.8 GM/DL
GLUCOSE SERPL-MCNC: 89 MG/DL (ref 70–100)
GLUCOSE UR STRIP-MCNC: NEGATIVE MG/DL
HBA1C MFR BLD: 5 % (ref 4.8–5.9)
HCT VFR BLD AUTO: 42.5 % (ref 37.5–51)
HDLC SERPL-MCNC: 35 MG/DL
HGB BLD-MCNC: 14.3 G/DL (ref 13–17.7)
HGB UR QL STRIP.AUTO: NEGATIVE
KETONES UR QL STRIP: NEGATIVE
LDLC SERPL CALC-MCNC: 110 MG/DL (ref 0–99)
LDLC/HDLC SERPL: 2.99 {RATIO}
LEUKOCYTE ESTERASE UR QL STRIP.AUTO: NEGATIVE
LYMPHOCYTES # BLD AUTO: 3 10*3/MM3 (ref 0.7–3.1)
LYMPHOCYTES NFR BLD AUTO: 45.5 % (ref 19.6–45.3)
MCH RBC QN AUTO: 28.8 PG (ref 26.6–33)
MCHC RBC AUTO-ENTMCNC: 33.6 G/DL (ref 31.5–35.7)
MCV RBC AUTO: 85.7 FL (ref 79–97)
METHADONE UR QL SCN: NEGATIVE
NEUTROPHILS NFR BLD AUTO: 3.1 10*3/MM3 (ref 1.7–7)
NEUTROPHILS NFR BLD AUTO: 44.8 % (ref 42.7–76)
NITRITE UR QL STRIP: NEGATIVE
OPIATES UR QL: NEGATIVE
OXYCODONE UR QL SCN: NEGATIVE
PCP UR QL SCN: NEGATIVE
PH UR STRIP.AUTO: 6 [PH] (ref 5–8)
PLATELET # BLD AUTO: 305 10*3/MM3 (ref 140–450)
PMV BLD AUTO: 9.3 FL (ref 6–12)
POTASSIUM SERPL-SCNC: 4.4 MMOL/L (ref 3.5–5.3)
PROT SERPL-MCNC: 7.5 G/DL (ref 6.3–8.7)
PROT UR QL STRIP: NEGATIVE
RBC # BLD AUTO: 4.96 10*6/MM3 (ref 4.14–5.8)
SODIUM SERPL-SCNC: 141 MMOL/L (ref 135–145)
SP GR UR STRIP: 1.02 (ref 1–1.03)
TRICYCLICS UR QL SCN: NEGATIVE
TRIGL SERPL-MCNC: 212 MG/DL (ref 0–149)
TSH SERPL DL<=0.05 MIU/L-ACNC: 1.36 UIU/ML (ref 0.27–4.2)
UROBILINOGEN UR QL STRIP: NORMAL
VLDLC SERPL-MCNC: 37 MG/DL (ref 5–40)
WBC NRBC COR # BLD AUTO: 6.7 10*3/MM3 (ref 3.4–10.8)

## 2024-10-02 PROCEDURE — 80053 COMPREHEN METABOLIC PANEL: CPT

## 2024-10-02 PROCEDURE — 80307 DRUG TEST PRSMV CHEM ANLYZR: CPT

## 2024-10-02 PROCEDURE — 99214 OFFICE O/P EST MOD 30 MIN: CPT

## 2024-10-02 PROCEDURE — 81003 URINALYSIS AUTO W/O SCOPE: CPT

## 2024-10-02 PROCEDURE — 99396 PREV VISIT EST AGE 40-64: CPT

## 2024-10-02 PROCEDURE — 84443 ASSAY THYROID STIM HORMONE: CPT

## 2024-10-02 PROCEDURE — 80061 LIPID PANEL: CPT

## 2024-10-02 PROCEDURE — 83036 HEMOGLOBIN GLYCOSYLATED A1C: CPT

## 2024-10-02 PROCEDURE — 36415 COLL VENOUS BLD VENIPUNCTURE: CPT

## 2024-10-02 PROCEDURE — 85025 COMPLETE CBC W/AUTO DIFF WBC: CPT

## 2024-10-02 RX ORDER — CYCLOBENZAPRINE HCL 10 MG
10 TABLET ORAL NIGHTLY
Qty: 30 TABLET | Refills: 2 | Status: SHIPPED | OUTPATIENT
Start: 2024-10-02

## 2024-10-02 RX ORDER — LISDEXAMFETAMINE DIMESYLATE 60 MG/1
60 CAPSULE ORAL EVERY MORNING
Qty: 30 CAPSULE | Refills: 0 | Status: SHIPPED | OUTPATIENT
Start: 2024-10-30 | End: 2024-11-29

## 2024-10-02 RX ORDER — ESCITALOPRAM OXALATE 10 MG/1
10 TABLET ORAL DAILY
Qty: 90 TABLET | Refills: 1 | Status: SHIPPED | OUTPATIENT
Start: 2024-10-02

## 2024-10-02 RX ORDER — LAMOTRIGINE 25 MG/1
50 TABLET ORAL
Qty: 60 TABLET | Refills: 2 | Status: SHIPPED | OUTPATIENT
Start: 2024-10-02

## 2024-10-02 RX ORDER — LISDEXAMFETAMINE DIMESYLATE 60 MG/1
60 CAPSULE ORAL EVERY MORNING
Qty: 30 CAPSULE | Refills: 0 | Status: SHIPPED | OUTPATIENT
Start: 2024-10-02 | End: 2024-11-01

## 2024-10-02 RX ORDER — DICLOFENAC SODIUM 75 MG/1
75 TABLET, DELAYED RELEASE ORAL EVERY 12 HOURS SCHEDULED
Qty: 60 TABLET | Refills: 2 | Status: SHIPPED | OUTPATIENT
Start: 2024-10-02

## 2024-10-02 RX ORDER — ATORVASTATIN CALCIUM 10 MG/1
10 TABLET, FILM COATED ORAL DAILY
Qty: 90 TABLET | Refills: 1 | Status: SHIPPED | OUTPATIENT
Start: 2024-10-02

## 2024-10-02 RX ORDER — AMLODIPINE AND BENAZEPRIL HYDROCHLORIDE 5; 10 MG/1; MG/1
1 CAPSULE ORAL DAILY
Qty: 30 CAPSULE | Refills: 2 | Status: SHIPPED | OUTPATIENT
Start: 2024-10-02

## 2024-10-02 NOTE — PATIENT INSTRUCTIONS
Discharge Instructions - ADHD Follow Up    - You will need to return to the office as instructed for follow up, or sooner if you develop symptoms.  - Medication should be taken first thing in the morning.  - It is your responsibility to safe guard your medication. Any medication that is lost or stolen may not be renewed until your next scheduled appointment.  - If you develop any symptoms such as headache, weight loss, loss of appetite, chest pain, palpitations, or change in behavior, please contact our office immediately or go to your local emergency rooms for any emergent needs.  - Your next appointment will be a walk in visit. Dr Saba is here Monday-Thursdays, and you will need to be signed in by 3 pm in order to guarantee your prescription is filled that day. You may be seen on Fridays or Saturdays for medication follow up as well, but your prescription will not be ready for  until the following Monday. Discharge Instructions - Mood Disorder Follow Up     - You will need to return to the office as instructed for follow up, or sooner if you develop symptoms  - Medication should be taken first thing in the morning  - It is your responsibility to safe guard your medication  - If you develop any symptoms such as headache, changes in weight, loss of appetite, chest pain, palpitations, or change in behavior, please contact our office immediately or go to your local emergency rooms for any emergent needs.  - Your next appointment will be walk in visit.  Dr. Saba is here Monday-Thursdays, and you will need to be signed in by 3 pm in order to guarantee your prescription is filled that day.  You may be seen on Fridays or Saturdays for medication follow up as well.

## 2024-10-02 NOTE — PROGRESS NOTES
"Chief Complaint  Annual Exam, ADHD, and Mood Disorder    Subjective    History of Present Illness      Patient presents to [facility] for   History of Present Illness  Pt here for annual physical and 3 month f/u. No complaints or concerns voiced at this time.       Review of Systems   All other systems reviewed and are negative.      I have reviewed and agree with the HPI and ROS information as above.  Rufina Sherman, APRN     Objective   Vital Signs:   /84   Pulse 76   Resp 18   Ht 180.3 cm (71\")   Wt 110 kg (241 lb 12.8 oz)   SpO2 97%   BMI 33.72 kg/m²            Physical Exam  Constitutional:       Appearance: Normal appearance. He is well-developed. He is obese.   HENT:      Head: Normocephalic and atraumatic.      Right Ear: Tympanic membrane, ear canal and external ear normal.      Left Ear: Tympanic membrane, ear canal and external ear normal.      Nose: Nose normal. No septal deviation, nasal tenderness or congestion.      Mouth/Throat:      Lips: Pink. No lesions.      Mouth: Mucous membranes are moist. No oral lesions.      Dentition: Normal dentition.      Pharynx: Oropharynx is clear. No pharyngeal swelling, oropharyngeal exudate or posterior oropharyngeal erythema.   Eyes:      General: Lids are normal. Vision grossly intact. No scleral icterus.        Right eye: No discharge.         Left eye: No discharge.      Extraocular Movements: Extraocular movements intact.      Conjunctiva/sclera: Conjunctivae normal.      Right eye: Right conjunctiva is not injected.      Left eye: Left conjunctiva is not injected.      Pupils: Pupils are equal, round, and reactive to light.   Neck:      Thyroid: No thyroid mass.      Trachea: Trachea normal.   Cardiovascular:      Rate and Rhythm: Normal rate and regular rhythm.      Heart sounds: Normal heart sounds. No murmur heard.     No gallop.   Pulmonary:      Effort: Pulmonary effort is normal.      Breath sounds: Normal " breath sounds and air entry. No wheezing, rhonchi or rales.   Abdominal:      General: There is no distension.      Palpations: Abdomen is soft. There is no mass.      Tenderness: There is no abdominal tenderness. There is no right CVA tenderness, left CVA tenderness, guarding or rebound.   Musculoskeletal:         General: No tenderness or deformity.      Left shoulder: Decreased range of motion.      Cervical back: Full passive range of motion without pain, normal range of motion and neck supple.      Thoracic back: Normal.      Right lower leg: No edema.      Left lower leg: No edema.   Skin:     General: Skin is warm and dry.      Coloration: Skin is not jaundiced.      Findings: No rash.   Neurological:      Mental Status: He is alert and oriented to person, place, and time.      Sensory: Sensation is intact.      Motor: Motor function is intact.      Coordination: Coordination is intact.      Gait: Gait is intact.      Deep Tendon Reflexes: Reflexes are normal and symmetric.   Psychiatric:         Mood and Affect: Mood and affect normal.         Judgment: Judgment normal.          MARCOS-7:      PHQ-2 Depression Screening  Little interest or pleasure in doing things?     Feeling down, depressed, or hopeless?     PHQ-2 Total Score       PHQ-9 Depression Screening  Little interest or pleasure in doing things?     Feeling down, depressed, or hopeless?     Trouble falling or staying asleep, or sleeping too much?     Feeling tired or having little energy?     Poor appetite or overeating?     Feeling bad about yourself - or that you are a failure or have let yourself or your family down?     Trouble concentrating on things, such as reading the newspaper or watching television?     Moving or speaking so slowly that other people could have noticed? Or the opposite - being so fidgety or restless that you have been moving around a lot more than usual?     Thoughts that you would be better off dead, or of hurting yourself  in some way?     PHQ-9 Total Score     If you checked off any problems, how difficult have these problems made it for you to do your work, take care of things at home, or get along with other people?        Result Review  Data Reviewed:                   Assessment and Plan      Diagnoses and all orders for this visit:    1. Encounter for annual physical exam (Primary)  -     CBC Auto Differential; Future  -     Comprehensive Metabolic Panel; Future  -     Hemoglobin A1c; Future  -     Lipid Panel; Future  -     Urinalysis With Culture If Indicated -; Future  -     TSH; Future    2. Mixed anxiety and depressive disorder  -     escitalopram (Lexapro) 10 MG tablet; Take 1 tablet by mouth Daily.  Dispense: 90 tablet; Refill: 1    3. Muscle spasms of neck  -     cyclobenzaprine (FLEXERIL) 10 MG tablet; Take 1 tablet by mouth Every Night.  Dispense: 30 tablet; Refill: 2    4. Hypertriglyceridemia  -     atorvastatin (Lipitor) 10 MG tablet; Take 1 tablet by mouth Daily.  Dispense: 90 tablet; Refill: 1    5. Primary hypertension  Comments:  Stable, continue same.  Orders:  -     amLODIPine-benazepril (LOTREL 5-10) 5-10 MG per capsule; Take 1 capsule by mouth Daily.  Dispense: 30 capsule; Refill: 2    6. Mood disorder  -     lamoTRIgine (LaMICtal) 25 MG tablet; Take 2 tablets by mouth every night at bedtime.  Dispense: 60 tablet; Refill: 2    7. Acute pain of left shoulder  -     MRI Shoulder Left Without Contrast; Future    8. Decreased ROM of left shoulder  -     MRI Shoulder Left Without Contrast; Future    9. 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 Morgan de la Tourette's syndrome    11. Attention deficit hyperactivity disorder (ADHD), combined type  -     Urine Drug Screen - Urine, Clean Catch; Future    Other orders  -     diclofenac (VOLTAREN) 75 MG EC tablet; Take 1 tablet by mouth 2 times every day  Dispense: 60 tablet;  Refill: 2    Patient is seen today for annual physical exam and chronic med refills.  Patient states he is doing well on his current medication regimens.  Patient states that he is still having difficulty with his left shoulder.  Patient has had x-ray, he has went to [facility], he states that he has also completed 6+ weeks of physical therapy.  He states that orthopedic [facility] has him on diclofenac and Flexeril and continues to tell him that it is inflammation.  He states that he cannot even move his shoulder up past the 90 degree or go behind his back.  He states he has constant pain.  Patient states that he is doing all the things such as physical therapy at home as well.  I did discuss with him that I can order his MRI for him today for further evaluation.  Patient would like to do this at this time.    Plan:  1.  Routine lab work ordered.  2.  Anxiety and depression controlled with Lexapro 10.  Patient denies any HI/SI.  3.  Continue Flexeril for muscle spasms.  4.  Continue Lipitor.  Lipid panel ordered.  5.  Hypertension stable with Lo-Trol.  Continue at home BP monitoring and low-sodium diet.  6.  Mood stable with Lamictal 50 mg.  Continue to monitor for rash.  7.  MRI ordered of left shoulder.  8.  Continue diclofenac as needed.  9.  ADHD stable well-controlled Vyvanse 60.    Preventive Care 40-64 Years Old, Male  Preventive care refers to lifestyle choices and visits with your health care provider that can promote health and wellness. Preventive care visits are also called wellness exams.  What can I expect for my preventive care visit?  Counseling  During your preventive care visit, your health care provider may ask about your:  Medical history, including:  Past medical problems.  Family medical history.  Current health, including:  Emotional well-being.  Home life and relationship well-being.  Sexual activity.  Lifestyle, including:  Alcohol, nicotine or tobacco, and drug use.  Access to firearms.  Diet,  exercise, and sleep habits.  Safety issues such as seatbelt and bike helmet use.  Sunscreen use.  Work and work environment.  Physical exam  Your health care provider will check your:  Height and weight. These may be used to calculate your BMI (body mass index). BMI is a measurement that tells if you are at a healthy weight.  Waist circumference. This measures the distance around your waistline. This measurement also tells if you are at a healthy weight and may help predict your risk of certain diseases, such as type 2 diabetes and high blood pressure.  Heart rate and blood pressure.  Body temperature.  Skin for abnormal spots.  What immunizations do I need?    Vaccines are usually given at various ages, according to a schedule. Your health care provider will recommend vaccines for you based on your age, medical history, and lifestyle or other factors, such as travel or where you work.  What tests do I need?  Screening  Your health care provider may recommend screening tests for certain conditions. This may include:  Lipid and cholesterol levels.  Diabetes screening. This is done by checking your blood sugar (glucose) after you have not eaten for a while (fasting).  Hepatitis B test.  Hepatitis C test.  HIV (human immunodeficiency virus) test.  STI (sexually transmitted infection) testing, if you are at risk.  Lung cancer screening.  Prostate cancer screening.  Colorectal cancer screening.  Talk with your health care provider about your test results, treatment options, and if necessary, the need for more tests.  Follow these instructions at home:  Eating and drinking    Eat a diet that includes fresh fruits and vegetables, whole grains, lean protein, and low-fat dairy products.  Take vitamin and mineral supplements as recommended by your health care provider.  Do not drink alcohol if your health care provider tells you not to drink.  If you drink alcohol:  Limit how much you have to 0-2 drinks a day.  Know how much  alcohol is in your drink. In the [place] one drink equals one 12 oz bottle of beer (355 mL), one 5 oz glass of wine (148 mL), or one 1½ oz glass of hard liquor (44 mL).  Lifestyle  Brush your teeth every morning and night with fluoride toothpaste. Floss one time each day.  Exercise for at least 30 minutes 5 or more days each week.  Do not use any products that contain nicotine or tobacco. These products include cigarettes, chewing tobacco, and vaping devices, such as e-cigarettes. If you need help quitting, ask your health care provider.  Do not use drugs.  If you are sexually active, practice safe sex. Use a condom or other form of protection to prevent STIs.  Take aspirin only as told by your health care provider. Make sure that you understand how much to take and what form to take. Work with your health care provider to find out whether it is safe and beneficial for you to take aspirin daily.  Find healthy ways to manage stress, such as:  Meditation, yoga, or listening to music.  Journaling.  Talking to a trusted person.  Spending time with friends and family.  Minimize exposure to UV radiation to reduce your risk of skin cancer.  Safety  Always wear your seat belt while driving or riding in a vehicle.  Do not drive:  If you have been drinking alcohol. Do not ride with someone who has been drinking.  When you are tired or distracted.  While texting.  If you have been using any mind-altering substances or drugs.  Wear a helmet and other protective equipment during sports activities.  If you have firearms in your house, make sure you follow all gun safety procedures.  What's next?  Go to your health care provider once a year for an annual wellness visit.  Ask your health care provider how often you should have your eyes and teeth checked.  Stay up to date on all vaccines.  This information is not intended to replace advice given to you by your health care provider. Make sure you discuss any questions you have with  your health care provider.         Follow Up   Return in about 3 months (around 1/2/2025) for ADHD.  Patient was given instructions and counseling regarding his condition or for health maintenance advice. Please see specific information pulled into the AVS if appropriate.

## 2024-10-04 ENCOUNTER — TELEPHONE (OUTPATIENT)
Dept: FAMILY MEDICINE CLINIC | Facility: CLINIC | Age: 39
End: 2024-10-04
Payer: COMMERCIAL

## 2024-10-04 NOTE — TELEPHONE ENCOUNTER
----- Message from Rufina Sherman sent at 10/4/2024  7:08 AM CDT -----  Please let patient know that labs show  UDS satisfactory;   Lipid panel; increase in trigs and ldl but I do not think patient was fasting. Please have monitor diet   CMP slight increase in liver enzyme, monitor intake of tylenol and alcohol   CBC stable   Tsh stable  Ua stable   A1c great

## 2024-10-28 DIAGNOSIS — F90.2 ATTENTION DEFICIT HYPERACTIVITY DISORDER (ADHD), COMBINED TYPE: ICD-10-CM

## 2024-10-28 RX ORDER — LISDEXAMFETAMINE DIMESYLATE 60 MG/1
60 CAPSULE ORAL EVERY MORNING
Qty: 30 CAPSULE | Refills: 0 | Status: SHIPPED | OUTPATIENT
Start: 2024-10-28 | End: 2024-11-27

## 2024-10-28 NOTE — TELEPHONE ENCOUNTER
Rx Refill Note  Requested Prescriptions     Pending Prescriptions Disp Refills    lisdexamfetamine (Vyvanse) 60 MG capsule 30 capsule 0     Sig: Take 1 capsule by mouth Every Morning      Last office visit with prescribing clinician: 10/2/24  Last telemedicine visit with prescribing clinician: Visit date not found   Next office visit with prescribing clinician: Visit date not found    CSA up to date 6/24/24  Last UDS was 10/2/24      Yana Chaudhary MA  10/28/24, 09:09 CDT

## 2024-11-22 ENCOUNTER — TELEPHONE (OUTPATIENT)
Dept: FAMILY MEDICINE CLINIC | Facility: CLINIC | Age: 39
End: 2024-11-22
Payer: COMMERCIAL

## 2024-11-22 ENCOUNTER — HOSPITAL ENCOUNTER (OUTPATIENT)
Dept: MRI IMAGING | Facility: HOSPITAL | Age: 39
Discharge: HOME | End: 2024-11-22
Payer: COMMERCIAL

## 2024-11-22 DIAGNOSIS — M25.512 ACUTE PAIN OF LEFT SHOULDER: ICD-10-CM

## 2024-11-22 DIAGNOSIS — M25.612 DECREASED ROM OF LEFT SHOULDER: ICD-10-CM

## 2024-11-22 PROCEDURE — 73221 MRI JOINT UPR EXTREM W/O DYE: CPT

## 2024-11-22 NOTE — TELEPHONE ENCOUNTER
----- Message from Rufina Sherman sent at 11/22/2024  2:40 PM CST -----  MRI negative with no abnormalities seen. Please let patient know and we can also do another round of PT if he is not better

## 2024-11-22 NOTE — TELEPHONE ENCOUNTER
Called patient and confirmed date of birth. Relayed results/recommendations. Pt verbally understood all, no questions at this time. Pt refuses PT referral at this time.

## 2024-12-20 ENCOUNTER — OFFICE VISIT (OUTPATIENT)
Dept: FAMILY MEDICINE CLINIC | Facility: CLINIC | Age: 39
End: 2024-12-20
Payer: COMMERCIAL

## 2024-12-20 VITALS
RESPIRATION RATE: 18 BRPM | HEIGHT: 71 IN | WEIGHT: 233.6 LBS | OXYGEN SATURATION: 98 % | BODY MASS INDEX: 32.7 KG/M2 | SYSTOLIC BLOOD PRESSURE: 132 MMHG | DIASTOLIC BLOOD PRESSURE: 87 MMHG | HEART RATE: 74 BPM

## 2024-12-20 DIAGNOSIS — F90.2 ATTENTION DEFICIT HYPERACTIVITY DISORDER (ADHD), COMBINED TYPE: Primary | ICD-10-CM

## 2024-12-20 DIAGNOSIS — F41.8 MIXED ANXIETY AND DEPRESSIVE DISORDER: ICD-10-CM

## 2024-12-20 DIAGNOSIS — M62.838 MUSCLE SPASMS OF NECK: ICD-10-CM

## 2024-12-20 DIAGNOSIS — E78.1 HYPERTRIGLYCERIDEMIA: ICD-10-CM

## 2024-12-20 DIAGNOSIS — F39 MOOD DISORDER: ICD-10-CM

## 2024-12-20 DIAGNOSIS — I10 PRIMARY HYPERTENSION: ICD-10-CM

## 2024-12-20 PROCEDURE — 99214 OFFICE O/P EST MOD 30 MIN: CPT

## 2024-12-20 PROCEDURE — 96127 BRIEF EMOTIONAL/BEHAV ASSMT: CPT

## 2024-12-20 RX ORDER — ATORVASTATIN CALCIUM 10 MG/1
10 TABLET, FILM COATED ORAL DAILY
Qty: 90 TABLET | Refills: 1 | Status: SHIPPED | OUTPATIENT
Start: 2024-12-20

## 2024-12-20 RX ORDER — DICLOFENAC SODIUM 75 MG/1
75 TABLET, DELAYED RELEASE ORAL EVERY 12 HOURS SCHEDULED
Qty: 60 TABLET | Refills: 2 | Status: SHIPPED | OUTPATIENT
Start: 2024-12-20

## 2024-12-20 RX ORDER — LISDEXAMFETAMINE DIMESYLATE 60 MG/1
60 CAPSULE ORAL EVERY MORNING
Qty: 30 CAPSULE | Refills: 0 | Status: SHIPPED | OUTPATIENT
Start: 2025-01-02 | End: 2025-02-01

## 2024-12-20 RX ORDER — AMLODIPINE AND BENAZEPRIL HYDROCHLORIDE 5; 10 MG/1; MG/1
1 CAPSULE ORAL DAILY
Qty: 30 CAPSULE | Refills: 2 | Status: SHIPPED | OUTPATIENT
Start: 2024-12-20

## 2024-12-20 RX ORDER — LISDEXAMFETAMINE DIMESYLATE 60 MG/1
60 CAPSULE ORAL EVERY MORNING
Qty: 30 CAPSULE | Refills: 0 | Status: SHIPPED | OUTPATIENT
Start: 2025-01-31 | End: 2025-03-02

## 2024-12-20 RX ORDER — LAMOTRIGINE 25 MG/1
50 TABLET ORAL
Qty: 60 TABLET | Refills: 2 | Status: SHIPPED | OUTPATIENT
Start: 2024-12-20

## 2024-12-20 RX ORDER — ESCITALOPRAM OXALATE 10 MG/1
10 TABLET ORAL DAILY
Qty: 90 TABLET | Refills: 1 | Status: SHIPPED | OUTPATIENT
Start: 2024-12-20

## 2024-12-20 RX ORDER — CYCLOBENZAPRINE HCL 10 MG
10 TABLET ORAL NIGHTLY
Qty: 30 TABLET | Refills: 2 | Status: SHIPPED | OUTPATIENT
Start: 2024-12-20

## 2024-12-20 NOTE — PROGRESS NOTES
"Chief Complaint  Mood Disorder, ADHD, Hypertension, Depression, and Anxiety    Subjective    History of Present Illness      Patient presents to [facility] for   History of Present Illness  Pt last seen 10/2/24. Pt here for f/u. No complaints or concerns voiced at this time.       Review of Systems   All other systems reviewed and are negative.      I have reviewed and agree with the HPI and ROS information as above.  Rufina Sherman, APRN     Objective   Vital Signs:   /87   Pulse 74   Resp 18   Ht 180.3 cm (71\")   Wt 106 kg (233 lb 9.6 oz)   SpO2 98%   BMI 32.58 kg/m²            Physical Exam  Constitutional:       Appearance: Normal appearance. He is well-developed. He is obese.   HENT:      Head: Normocephalic and atraumatic.      Right Ear: Tympanic membrane, ear canal and external ear normal.      Left Ear: Tympanic membrane, ear canal and external ear normal.      Nose: Nose normal. No septal deviation, nasal tenderness or congestion.      Mouth/Throat:      Lips: Pink. No lesions.      Mouth: Mucous membranes are moist. No oral lesions.      Dentition: Normal dentition.      Pharynx: Oropharynx is clear. No pharyngeal swelling, oropharyngeal exudate or posterior oropharyngeal erythema.   Eyes:      General: Lids are normal. Vision grossly intact. No scleral icterus.        Right eye: No discharge.         Left eye: No discharge.      Extraocular Movements: Extraocular movements intact.      Conjunctiva/sclera: Conjunctivae normal.      Right eye: Right conjunctiva is not injected.      Left eye: Left conjunctiva is not injected.      Pupils: Pupils are equal, round, and reactive to light.   Neck:      Thyroid: No thyroid mass.      Trachea: Trachea normal.   Cardiovascular:      Rate and Rhythm: Normal rate and regular rhythm.      Heart sounds: Normal heart sounds. No murmur heard.     No gallop.   Pulmonary:      Effort: Pulmonary effort is normal.      Breath " sounds: Normal breath sounds and air entry. No wheezing, rhonchi or rales.   Abdominal:      General: There is no distension.      Palpations: Abdomen is soft. There is no mass.      Tenderness: There is no abdominal tenderness. There is no right CVA tenderness, left CVA tenderness, guarding or rebound.   Musculoskeletal:         General: No tenderness or deformity. Normal range of motion.      Cervical back: Full passive range of motion without pain, normal range of motion and neck supple.      Thoracic back: Normal.      Right lower leg: No edema.      Left lower leg: No edema.   Skin:     General: Skin is warm and dry.      Coloration: Skin is not jaundiced.      Findings: No rash.   Neurological:      Mental Status: He is alert and oriented to person, place, and time.      Sensory: Sensation is intact.      Motor: Motor function is intact.      Coordination: Coordination is intact.      Gait: Gait is intact.      Deep Tendon Reflexes: Reflexes are normal and symmetric.   Psychiatric:         Mood and Affect: Mood and affect normal.         Judgment: Judgment normal.          MARCOS-7:      PHQ-2 Depression Screening    Little interest or pleasure in doing things?     Feeling down, depressed, or hopeless?     PHQ-2 Total Score        PHQ-9 Depression Screening  Little interest or pleasure in doing things?     Feeling down, depressed, or hopeless?     PHQ-2 Total Score     Trouble falling or staying asleep, or sleeping too much?     Feeling tired or having little energy?     Poor appetite or overeating?     Feeling bad about yourself - or that you are a failure or have let yourself or your family down?     Trouble concentrating on things, such as reading the newspaper or watching television?     Moving or speaking so slowly that other people could have noticed? Or the opposite - being so fidgety or restless that you have been moving around a lot more than usual?     Thoughts that you would be better off dead, or of  hurting yourself in some way?     PHQ-9 Total Score     If you checked off any problems, how difficult have these problems made it for you to do your work, take care of things at home, or get along with other people?             Result Review  Data Reviewed:                   Assessment and Plan      Diagnoses and all orders for this visit:    1. Attention deficit hyperactivity disorder (ADHD), combined type (Primary)  Comments:  Stable with Vyvanse 60mg. UDS is utd, wanda pending,.    2. Primary hypertension  Comments:  Stable with Lotrel, cont at home bP monitoring and low NA diet.  Orders:  -     amLODIPine-benazepril (LOTREL 5-10) 5-10 MG per capsule; Take 1 capsule by mouth Daily.  Dispense: 30 capsule; Refill: 2    3. Hypertriglyceridemia  Comments:  Continue lipior 10mg.  Orders:  -     atorvastatin (Lipitor) 10 MG tablet; Take 1 tablet by mouth Daily.  Dispense: 90 tablet; Refill: 1    4. Muscle spasms of neck  -     cyclobenzaprine (FLEXERIL) 10 MG tablet; Take 1 tablet by mouth Every Night.  Dispense: 30 tablet; Refill: 2    5. Mixed anxiety and depressive disorder  Comments:  Stable with Lexapro 10mg. Painet denies any HISI  Orders:  -     escitalopram (Lexapro) 10 MG tablet; Take 1 tablet by mouth Daily.  Dispense: 90 tablet; Refill: 1    6. Mood disorder  Comments:  Mood stbale with Lamictal.  Orders:  -     lamoTRIgine (LaMICtal) 25 MG tablet; Take 2 tablets by mouth every night at bedtime.  Dispense: 60 tablet; Refill: 2    Other orders  -     diclofenac (VOLTAREN) 75 MG EC tablet; Take 1 tablet by mouth 2 times every day  Dispense: 60 tablet; Refill: 2            Follow Up   Return in about 3 months (around 3/20/2025) for ADHD.  Patient was given instructions and counseling regarding his condition or for health maintenance advice. Please see specific information pulled into the AVS if appropriate.

## 2025-02-26 ENCOUNTER — HOSPITAL ENCOUNTER (OUTPATIENT)
Dept: ULTRASOUND IMAGING | Facility: HOSPITAL | Age: 40
Discharge: HOME | End: 2025-02-26
Admitting: NURSE PRACTITIONER
Payer: COMMERCIAL

## 2025-02-26 ENCOUNTER — TRANSCRIBE ORDERS (OUTPATIENT)
Dept: ADMINISTRATIVE | Facility: HOSPITAL | Age: 40
End: 2025-02-26
Payer: COMMERCIAL

## 2025-02-26 DIAGNOSIS — N50.812 TESTICULAR PAIN, LEFT: ICD-10-CM

## 2025-02-26 DIAGNOSIS — N50.812 TESTICULAR PAIN, LEFT: Primary | ICD-10-CM

## 2025-02-26 PROCEDURE — 76870 US EXAM SCROTUM: CPT

## 2025-03-06 DIAGNOSIS — F90.2 ATTENTION DEFICIT HYPERACTIVITY DISORDER (ADHD), COMBINED TYPE: ICD-10-CM

## 2025-03-06 RX ORDER — LISDEXAMFETAMINE DIMESYLATE 60 MG/1
60 CAPSULE ORAL EVERY MORNING
Qty: 30 CAPSULE | Refills: 0 | Status: CANCELLED | OUTPATIENT
Start: 2025-03-06 | End: 2025-04-05

## 2025-03-10 DIAGNOSIS — F90.2 ATTENTION DEFICIT HYPERACTIVITY DISORDER (ADHD), COMBINED TYPE: ICD-10-CM

## 2025-03-10 RX ORDER — LISDEXAMFETAMINE DIMESYLATE 60 MG/1
60 CAPSULE ORAL EVERY MORNING
Qty: 30 CAPSULE | Refills: 0 | Status: CANCELLED | OUTPATIENT
Start: 2025-03-10 | End: 2025-04-09

## 2025-03-11 ENCOUNTER — PATIENT MESSAGE (OUTPATIENT)
Dept: FAMILY MEDICINE CLINIC | Facility: CLINIC | Age: 40
End: 2025-03-11
Payer: COMMERCIAL

## 2025-03-11 DIAGNOSIS — F90.2 ATTENTION DEFICIT HYPERACTIVITY DISORDER (ADHD), COMBINED TYPE: Primary | ICD-10-CM

## 2025-03-12 DIAGNOSIS — F90.2 ATTENTION-DEFICIT HYPERACTIVITY DISORDER, COMBINED TYPE: ICD-10-CM

## 2025-03-12 RX ORDER — LISDEXAMFETAMINE DIMESYLATE 60 MG/1
60 CAPSULE ORAL EVERY MORNING
Qty: 30 CAPSULE | Refills: 0 | OUTPATIENT
Start: 2025-03-12 | End: 2025-04-11

## 2025-03-12 RX ORDER — LISDEXAMFETAMINE DIMESYLATE 60 MG/1
60 CAPSULE ORAL EVERY MORNING
Qty: 30 CAPSULE | Refills: 0 | Status: SHIPPED | OUTPATIENT
Start: 2025-03-12 | End: 2025-04-12

## 2025-03-12 NOTE — TELEPHONE ENCOUNTER
Pt last seen 12/20/24. Pt was ok'd for 3 months.  Refill: 3rd  Date of last script: 1/31/25  UDS 10/2/24  F/u apt: 3/21/25  Routing to Dr. Saba to review

## 2025-03-21 ENCOUNTER — OFFICE VISIT (OUTPATIENT)
Dept: FAMILY MEDICINE CLINIC | Facility: CLINIC | Age: 40
End: 2025-03-21
Payer: COMMERCIAL

## 2025-03-21 VITALS
RESPIRATION RATE: 18 BRPM | HEART RATE: 81 BPM | OXYGEN SATURATION: 98 % | SYSTOLIC BLOOD PRESSURE: 133 MMHG | BODY MASS INDEX: 33.43 KG/M2 | WEIGHT: 238.8 LBS | HEIGHT: 71 IN | DIASTOLIC BLOOD PRESSURE: 88 MMHG

## 2025-03-21 DIAGNOSIS — F39 MOOD DISORDER: ICD-10-CM

## 2025-03-21 DIAGNOSIS — I10 PRIMARY HYPERTENSION: Primary | ICD-10-CM

## 2025-03-21 DIAGNOSIS — F41.8 MIXED ANXIETY AND DEPRESSIVE DISORDER: ICD-10-CM

## 2025-03-21 DIAGNOSIS — E78.1 HYPERTRIGLYCERIDEMIA: ICD-10-CM

## 2025-03-21 DIAGNOSIS — F90.2 ATTENTION DEFICIT HYPERACTIVITY DISORDER (ADHD), COMBINED TYPE: ICD-10-CM

## 2025-03-21 DIAGNOSIS — M62.838 MUSCLE SPASMS OF NECK: ICD-10-CM

## 2025-03-21 DIAGNOSIS — F90.2 ATTENTION DEFICIT HYPERACTIVITY DISORDER (ADHD), COMBINED TYPE: Primary | ICD-10-CM

## 2025-03-21 PROCEDURE — 99214 OFFICE O/P EST MOD 30 MIN: CPT

## 2025-03-21 RX ORDER — CYCLOBENZAPRINE HCL 10 MG
10 TABLET ORAL NIGHTLY
Qty: 30 TABLET | Refills: 2 | Status: SHIPPED | OUTPATIENT
Start: 2025-03-21

## 2025-03-21 RX ORDER — LAMOTRIGINE 25 MG/1
50 TABLET ORAL
Qty: 60 TABLET | Refills: 2 | Status: SHIPPED | OUTPATIENT
Start: 2025-03-21

## 2025-03-21 RX ORDER — ESCITALOPRAM OXALATE 10 MG/1
10 TABLET ORAL DAILY
Qty: 90 TABLET | Refills: 1 | Status: SHIPPED | OUTPATIENT
Start: 2025-03-21

## 2025-03-21 RX ORDER — LISDEXAMFETAMINE DIMESYLATE 60 MG/1
60 CAPSULE ORAL EVERY MORNING
Qty: 30 CAPSULE | Refills: 0 | Status: SHIPPED | OUTPATIENT
Start: 2025-05-09 | End: 2025-06-08

## 2025-03-21 RX ORDER — DICLOFENAC SODIUM 75 MG/1
75 TABLET, DELAYED RELEASE ORAL 2 TIMES DAILY
Qty: 60 TABLET | Refills: 2 | Status: SHIPPED | OUTPATIENT
Start: 2025-03-21

## 2025-03-21 RX ORDER — LISDEXAMFETAMINE DIMESYLATE 60 MG/1
60 CAPSULE ORAL EVERY MORNING
Qty: 30 CAPSULE | Refills: 0 | Status: SHIPPED | OUTPATIENT
Start: 2025-04-11 | End: 2025-05-11

## 2025-03-21 RX ORDER — AMLODIPINE AND BENAZEPRIL HYDROCHLORIDE 5; 10 MG/1; MG/1
1 CAPSULE ORAL DAILY
Qty: 30 CAPSULE | Refills: 2 | Status: SHIPPED | OUTPATIENT
Start: 2025-03-21

## 2025-03-21 RX ORDER — ATORVASTATIN CALCIUM 10 MG/1
10 TABLET, FILM COATED ORAL DAILY
Qty: 90 TABLET | Refills: 1 | Status: SHIPPED | OUTPATIENT
Start: 2025-03-21

## 2025-03-21 NOTE — PROGRESS NOTES
"Chief Complaint  ADHD and Hypertension    Subjective    History of Present Illness      Patient presents to [facility] for   History of Present Illness  Pt last seen 12/20/24. Pt presents for 3 month f/u. No complaints or concerns voiced at this time.       History of Present Illness      Review of Systems   All other systems reviewed and are negative.      I have reviewed and agree with the HPI and ROS information as above.  Rufina Sherman, APRN     Objective   Vital Signs:   /88   Pulse 81   Resp 18   Ht 180.3 cm (71\")   Wt 108 kg (238 lb 12.8 oz)   SpO2 98%   BMI 33.31 kg/m²            Physical Exam  Constitutional:       Appearance: Normal appearance. He is well-developed. He is obese.   HENT:      Head: Normocephalic and atraumatic.      Right Ear: External ear normal.      Left Ear: External ear normal.      Nose: Nose normal. No nasal tenderness or congestion.      Mouth/Throat:      Lips: Pink. No lesions.      Mouth: Mucous membranes are moist. No oral lesions.      Dentition: Normal dentition.      Pharynx: Oropharynx is clear. No pharyngeal swelling, oropharyngeal exudate or posterior oropharyngeal erythema.   Eyes:      General: Lids are normal. Vision grossly intact. No scleral icterus.        Right eye: No discharge.         Left eye: No discharge.      Extraocular Movements: Extraocular movements intact.      Conjunctiva/sclera: Conjunctivae normal.      Right eye: Right conjunctiva is not injected.      Left eye: Left conjunctiva is not injected.      Pupils: Pupils are equal, round, and reactive to light.   Cardiovascular:      Rate and Rhythm: Normal rate and regular rhythm.      Heart sounds: Normal heart sounds. No murmur heard.     No gallop.   Pulmonary:      Effort: Pulmonary effort is normal.      Breath sounds: Normal breath sounds and air entry. No wheezing, rhonchi or rales.   Musculoskeletal:         General: No tenderness or deformity. Normal " range of motion.      Cervical back: Full passive range of motion without pain, normal range of motion and neck supple.      Right lower leg: No edema.      Left lower leg: No edema.   Skin:     General: Skin is warm and dry.      Coloration: Skin is not jaundiced.      Findings: No rash.   Neurological:      Mental Status: He is alert and oriented to person, place, and time.      Sensory: Sensation is intact.      Motor: Motor function is intact.      Coordination: Coordination is intact.      Gait: Gait is intact.   Psychiatric:         Attention and Perception: Attention normal.         Mood and Affect: Mood and affect normal.         Behavior: Behavior is not hyperactive. Behavior is cooperative.         Thought Content: Thought content normal.         Judgment: Judgment normal.          MARCOS-7:      PHQ-2 Depression Screening    Little interest or pleasure in doing things? Not at all   Feeling down, depressed, or hopeless? Not at all   PHQ-2 Total Score 0      PHQ-9 Depression Screening  Little interest or pleasure in doing things? Not at all   Feeling down, depressed, or hopeless? Not at all   PHQ-2 Total Score 0   Trouble falling or staying asleep, or sleeping too much?     Feeling tired or having little energy?     Poor appetite or overeating?     Feeling bad about yourself - or that you are a failure or have let yourself or your family down?     Trouble concentrating on things, such as reading the newspaper or watching television?     Moving or speaking so slowly that other people could have noticed? Or the opposite - being so fidgety or restless that you have been moving around a lot more than usual?     Thoughts that you would be better off dead, or of hurting yourself in some way?     PHQ-9 Total Score     If you checked off any problems, how difficult have these problems made it for you to do your work, take care of things at home, or get along with other people? Not difficult at all           Result  Review  Data Reviewed:                   Assessment and Plan      Diagnoses and all orders for this visit:    1. Primary hypertension (Primary)  Comments:  Stable with Lotrel, cont at home bP monitoring and low NA diet.  Orders:  -     amLODIPine-benazepril (LOTREL 5-10) 5-10 MG per capsule; Take 1 capsule by mouth Daily.  Dispense: 30 capsule; Refill: 2    2. Hypertriglyceridemia  Comments:  Continue lipior 10mg.  Orders:  -     atorvastatin (Lipitor) 10 MG tablet; Take 1 tablet by mouth Daily.  Dispense: 90 tablet; Refill: 1    3. Muscle spasms of neck  -     cyclobenzaprine (FLEXERIL) 10 MG tablet; Take 1 tablet by mouth Every Night.  Dispense: 30 tablet; Refill: 2    4. Mixed anxiety and depressive disorder  Comments:  Stable with Lexapro 10mg. Painet denies any HISI  Orders:  -     escitalopram (Lexapro) 10 MG tablet; Take 1 tablet by mouth Daily.  Dispense: 90 tablet; Refill: 1    5. Mood disorder  Comments:  Mood stbale with Lamictal. Patient denies any HISI.  Orders:  -     lamoTRIgine (LaMICtal) 25 MG tablet; Take 2 tablets by mouth every night at bedtime.  Dispense: 60 tablet; Refill: 2    6. Attention deficit hyperactivity disorder (ADHD), combined type  Comments:  ADHD stable with Vyvanse 50mg. UDS is utd, ksaper pending.    Other orders  -     diclofenac (VOLTAREN) 75 MG EC tablet; Take 1 tablet by mouth 2 times every day  Dispense: 60 tablet; Refill: 2        Assessment & Plan          Follow Up   Return in about 3 months (around 6/21/2025) for ADHD.  Patient was given instructions and counseling regarding his condition or for health maintenance advice. Please see specific information pulled into the AVS if appropriate.     Patient or patient representative verbalized consent for the use of Ambient Listening during the visit with  DONNA Spring for chart documentation. 3/21/2025  10:08 CDT

## 2025-06-20 ENCOUNTER — LAB (OUTPATIENT)
Dept: LAB | Facility: HOSPITAL | Age: 40
End: 2025-06-20
Payer: COMMERCIAL

## 2025-06-20 ENCOUNTER — OFFICE VISIT (OUTPATIENT)
Dept: FAMILY MEDICINE CLINIC | Facility: CLINIC | Age: 40
End: 2025-06-20
Payer: COMMERCIAL

## 2025-06-20 VITALS
RESPIRATION RATE: 20 BRPM | DIASTOLIC BLOOD PRESSURE: 69 MMHG | SYSTOLIC BLOOD PRESSURE: 105 MMHG | TEMPERATURE: 98 F | BODY MASS INDEX: 33.18 KG/M2 | HEART RATE: 84 BPM | WEIGHT: 237 LBS | HEIGHT: 71 IN

## 2025-06-20 DIAGNOSIS — I10 PRIMARY HYPERTENSION: ICD-10-CM

## 2025-06-20 DIAGNOSIS — F90.2 ATTENTION DEFICIT HYPERACTIVITY DISORDER (ADHD), COMBINED TYPE: ICD-10-CM

## 2025-06-20 DIAGNOSIS — F39 MOOD DISORDER: ICD-10-CM

## 2025-06-20 DIAGNOSIS — F41.8 MIXED ANXIETY AND DEPRESSIVE DISORDER: ICD-10-CM

## 2025-06-20 DIAGNOSIS — E78.1 HYPERTRIGLYCERIDEMIA: ICD-10-CM

## 2025-06-20 DIAGNOSIS — F90.2 ATTENTION DEFICIT HYPERACTIVITY DISORDER (ADHD), COMBINED TYPE: Primary | ICD-10-CM

## 2025-06-20 DIAGNOSIS — I10 PRIMARY HYPERTENSION: Primary | ICD-10-CM

## 2025-06-20 LAB
AMPHET+METHAMPHET UR QL: POSITIVE
AMPHETAMINES UR QL: NEGATIVE
BARBITURATES UR QL SCN: NEGATIVE
BENZODIAZ UR QL SCN: NEGATIVE
BILIRUB UR QL STRIP: NEGATIVE
BUPRENORPHINE SERPL-MCNC: NEGATIVE NG/ML
CANNABINOIDS SERPL QL: NEGATIVE
CLARITY UR: CLEAR
COCAINE UR QL: NEGATIVE
COLOR UR: YELLOW
GLUCOSE UR STRIP-MCNC: NEGATIVE MG/DL
HGB UR QL STRIP.AUTO: NEGATIVE
KETONES UR QL STRIP: NEGATIVE
LEUKOCYTE ESTERASE UR QL STRIP.AUTO: NEGATIVE
METHADONE UR QL SCN: NEGATIVE
NITRITE UR QL STRIP: NEGATIVE
OPIATES UR QL: NEGATIVE
OXYCODONE UR QL SCN: NEGATIVE
PCP UR QL SCN: NEGATIVE
PH UR STRIP.AUTO: 6 [PH] (ref 5–8)
PROT UR QL STRIP: NEGATIVE
SP GR UR STRIP: 1.02 (ref 1–1.03)
TRICYCLICS UR QL SCN: NEGATIVE
UROBILINOGEN UR QL STRIP: NORMAL

## 2025-06-20 PROCEDURE — 81003 URINALYSIS AUTO W/O SCOPE: CPT

## 2025-06-20 PROCEDURE — 80307 DRUG TEST PRSMV CHEM ANLYZR: CPT

## 2025-06-20 PROCEDURE — 80306 DRUG TEST PRSMV INSTRMNT: CPT

## 2025-06-20 PROCEDURE — 96127 BRIEF EMOTIONAL/BEHAV ASSMT: CPT

## 2025-06-20 PROCEDURE — 99214 OFFICE O/P EST MOD 30 MIN: CPT

## 2025-06-20 RX ORDER — AMLODIPINE AND BENAZEPRIL HYDROCHLORIDE 5; 10 MG/1; MG/1
1 CAPSULE ORAL DAILY
Qty: 30 CAPSULE | Refills: 2 | Status: SHIPPED | OUTPATIENT
Start: 2025-06-20

## 2025-06-20 RX ORDER — LAMOTRIGINE 25 MG/1
50 TABLET ORAL
Qty: 60 TABLET | Refills: 2 | Status: SHIPPED | OUTPATIENT
Start: 2025-06-20

## 2025-06-20 RX ORDER — ESCITALOPRAM OXALATE 10 MG/1
10 TABLET ORAL DAILY
Qty: 90 TABLET | Refills: 1 | Status: SHIPPED | OUTPATIENT
Start: 2025-06-20

## 2025-06-20 RX ORDER — ATORVASTATIN CALCIUM 10 MG/1
10 TABLET, FILM COATED ORAL DAILY
Qty: 90 TABLET | Refills: 1 | Status: SHIPPED | OUTPATIENT
Start: 2025-06-20

## 2025-06-20 NOTE — PROGRESS NOTES
"Chief Complaint  3 month, Anxiety, Depression, mood disorder, Hypertension, and ADHD    Subjective    History of Present Illness      Patient presents to [facility] for   History of Present Illness     History of Present Illness      Review of Systems   All other systems reviewed and are negative.      I have reviewed and agree with the HPI and ROS information as above.  Rufina Sherman, APRN     Objective   Vital Signs:   /69   Pulse 84   Temp 98 °F (36.7 °C) (Temporal)   Resp 20   Ht 180.3 cm (71\")   Wt 108 kg (237 lb)   BMI 33.05 kg/m²            Physical Exam  Constitutional:       Appearance: Normal appearance. He is well-developed. He is obese.   HENT:      Head: Normocephalic and atraumatic.      Right Ear: Tympanic membrane, ear canal and external ear normal.      Left Ear: Tympanic membrane, ear canal and external ear normal.      Nose: Nose normal. No septal deviation, nasal tenderness or congestion.      Mouth/Throat:      Lips: Pink. No lesions.      Mouth: Mucous membranes are moist. No oral lesions.      Dentition: Normal dentition.      Pharynx: Oropharynx is clear. No pharyngeal swelling, oropharyngeal exudate or posterior oropharyngeal erythema.   Eyes:      General: Lids are normal. Vision grossly intact. No scleral icterus.        Right eye: No discharge.         Left eye: No discharge.      Extraocular Movements: Extraocular movements intact.      Conjunctiva/sclera: Conjunctivae normal.      Right eye: Right conjunctiva is not injected.      Left eye: Left conjunctiva is not injected.      Pupils: Pupils are equal, round, and reactive to light.   Neck:      Thyroid: No thyroid mass.      Trachea: Trachea normal.   Cardiovascular:      Rate and Rhythm: Normal rate and regular rhythm.      Heart sounds: Normal heart sounds. No murmur heard.     No gallop.   Pulmonary:      Effort: Pulmonary effort is normal.      Breath sounds: Normal breath sounds and air " entry. No wheezing, rhonchi or rales.   Abdominal:      General: There is no distension.      Palpations: Abdomen is soft. There is no mass.      Tenderness: There is no abdominal tenderness. There is no right CVA tenderness, left CVA tenderness, guarding or rebound.   Musculoskeletal:         General: No tenderness or deformity. Normal range of motion.      Cervical back: Full passive range of motion without pain, normal range of motion and neck supple.      Thoracic back: Normal.      Right lower leg: No edema.      Left lower leg: No edema.   Skin:     General: Skin is warm and dry.      Coloration: Skin is not jaundiced.      Findings: No rash.   Neurological:      Mental Status: He is alert and oriented to person, place, and time.      Sensory: Sensation is intact.      Motor: Motor function is intact.      Coordination: Coordination is intact.      Gait: Gait is intact.      Deep Tendon Reflexes: Reflexes are normal and symmetric.   Psychiatric:         Mood and Affect: Mood and affect normal.         Judgment: Judgment normal.          MARCOS-7: Over the last two weeks, how often have you been bothered by the following problems?  Feeling nervous, anxious or on edge: Not at all  Not being able to stop or control worrying: Not at all  Worrying too much about different things: Not at all  Trouble Relaxing: Not at all  Being so restless that it is hard to sit still: Not at all  Becoming easily annoyed or irritable: Not at all  Feeling afraid as if something awful might happen: Not at all  MARCOS 7 Total Score: 0  If you checked any problems, how difficult have these problems made it for you to do your work, take care of things at home, or get along with other people: Not difficult at all    PHQ-2 Depression Screening    Little interest or pleasure in doing things? Not at all   Feeling down, depressed, or hopeless? Not at all   PHQ-2 Total Score 0      PHQ-9 Depression Screening  Little interest or pleasure in doing  things? Not at all   Feeling down, depressed, or hopeless? Not at all   PHQ-2 Total Score 0   Trouble falling or staying asleep, or sleeping too much?     Feeling tired or having little energy?     Poor appetite or overeating?     Feeling bad about yourself - or that you are a failure or have let yourself or your family down?     Trouble concentrating on things, such as reading the newspaper or watching television?     Moving or speaking so slowly that other people could have noticed? Or the opposite - being so fidgety or restless that you have been moving around a lot more than usual?     Thoughts that you would be better off dead, or of hurting yourself in some way?     PHQ-9 Total Score     If you checked off any problems, how difficult have these problems made it for you to do your work, take care of things at home, or get along with other people? Not difficult at all           Result Review  Data Reviewed:                   Assessment and Plan      Diagnoses and all orders for this visit:    1. Primary hypertension (Primary)  Comments:  Stable with Lotrel, cont at home BP monitoring and low NA diet.  [place] labs ordered  Orders:  -     amLODIPine-benazepril (LOTREL 5-10) 5-10 MG per capsule; Take 1 capsule by mouth Daily.  Dispense: 30 capsule; Refill: 2  -     Comprehensive Metabolic Panel; Future  -     CBC Auto Differential; Future  -     Hemoglobin A1c; Future  -     Lipid Panel; Future  -     TSH; Future  -     Urinalysis With Culture If Indicated -; Future    2. Hypertriglyceridemia  Comments:  Continue lipior 10mg. Lipid panel ordered.  Orders:  -     atorvastatin (Lipitor) 10 MG tablet; Take 1 tablet by mouth Daily.  Dispense: 90 tablet; Refill: 1  -     Comprehensive Metabolic Panel; Future  -     CBC Auto Differential; Future  -     Hemoglobin A1c; Future  -     Lipid Panel; Future  -     TSH; Future  -     Urinalysis With Culture If Indicated -; Future    3. Mixed anxiety and depressive  disorder  Comments:  Stable with Lexapro 10mg. Painet denies any HISI  Orders:  -     escitalopram (Lexapro) 10 MG tablet; Take 1 tablet by mouth Daily.  Dispense: 90 tablet; Refill: 1  -     Comprehensive Metabolic Panel; Future  -     CBC Auto Differential; Future  -     Hemoglobin A1c; Future  -     Lipid Panel; Future  -     TSH; Future  -     Urinalysis With Culture If Indicated -; Future    4. Mood disorder  Comments:  Mood stbale with Lamictal. Patient denies any HISI.  Orders:  -     lamoTRIgine (LaMICtal) 25 MG tablet; Take 2 tablets by mouth every night at bedtime.  Dispense: 60 tablet; Refill: 2    5. Attention deficit hyperactivity disorder (ADHD), combined type  Comments:  ADHD stable with Vyvanse 60mg. UDS is ordered, wanda pending.  Orders:  -     Urine Drug Screen - Urine, Clean Catch; Future        Assessment & Plan          Follow Up   Return for ADHD.  Patient was given instructions and counseling regarding his condition or for health maintenance advice. Please see specific information pulled into the AVS if appropriate.     Patient or patient representative verbalized consent for the use of Ambient Listening during the visit with  DONNA Spring for chart documentation. 6/20/2025  08:11 CDT

## 2025-06-20 NOTE — PROGRESS NOTES
"Chief Complaint  3 month, Anxiety, Depression, mood disorder, Hypertension, and ADHD    Subjective    History of Present Illness {CC  Problem List  Visit Diagnosis   Encounters  Notes  Medications  Labs  Result Review Imaging  Media :23}     Patient presents to [facility] for   History of Present Illness  ADHD/Mood HPI    Visit for:  follow-up. Most recent visit was 3 months ago.  Interim changes to follow up on today: no change in medication  Work/School Performance:  going well  Cognitive:  able to focus    Behavior  Hyperactivity: is not hyperactive  Impulsivity: no impulsivity and no unsafe behavior  Tasking: able to initiate tasks, able to complete tasks, and able to mult-task    Social  ADHD social/impulsive symptoms:  not impatient, does not blurt out inappropriate comments, and no excessive talking    Behavioral health  Behavior: no concerns  Emotional coping: demonstrates feelings of no concerns           History of Present Illness      Review of Systems    I have reviewed and agree with the {\[facility] [place] [facility]\"" ROS Review:38326} information as above.  Luis Daniel Moore MA     Objective   Vital Signs:   /69   Pulse 84   Temp 98 °F (36.7 °C) (Temporal)   Resp 20   Ht 180.3 cm (71\")   Wt 108 kg (237 lb)   BMI 33.05 kg/m²            Physical Exam     MARCOS-7: Over the last two weeks, how often have you been bothered by the following problems?  Feeling nervous, anxious or on edge: Not at all  Not being able to stop or control worrying: Not at all  Worrying too much about different things: Not at all  Trouble Relaxing: Not at all  Being so restless that it is hard to sit still: Not at all  Becoming easily annoyed or irritable: Not at all  Feeling afraid as if something awful might happen: Not at all  MARCOS 7 Total Score: 0  If you checked any problems, how difficult have these problems made it for you to do your work, take care of things at home, or get along with other people: Not " difficult at all    PHQ-2 Depression Screening    Little interest or pleasure in doing things? Not at all   Feeling down, depressed, or hopeless? Not at all   PHQ-2 Total Score 0      PHQ-9 Depression Screening  Little interest or pleasure in doing things? Not at all   Feeling down, depressed, or hopeless? Not at all   PHQ-2 Total Score 0   Trouble falling or staying asleep, or sleeping too much?     Feeling tired or having little energy?     Poor appetite or overeating?     Feeling bad about yourself - or that you are a failure or have let yourself or your family down?     Trouble concentrating on things, such as reading the newspaper or watching television?     Moving or speaking so slowly that other people could have noticed? Or the opposite - being so fidgety or restless that you have been moving around a lot more than usual?     Thoughts that you would be better off dead, or of hurting yourself in some way?     PHQ-9 Total Score     If you checked off any problems, how difficult have these problems made it for you to do your work, take care of things at home, or get along with other people? Not difficult at all           Result Review  Data Reviewed:{ Labs  Result Review  Imaging  Med Tab  Media :23}   {The following data was reviewed by (Optional):28126}  {Ambulatory Labs (Optional):77113}  {Data reviewed (Optional):18934:::1}            Assessment and Plan {CC Problem List  Visit Diagnosis  ROS  Review (Popup)  Health Maintenance  Quality  BestPractice  Medications  SmartSets  SnapShot Encounters  Media :23}     There are no diagnoses linked to this encounter.    Assessment & Plan      {Time Spent (Optional):67469}    Follow Up {Instructions Charge Capture  Follow-up Communications :23}  No follow-ups on file.  Patient was given instructions and counseling regarding his condition or for health maintenance advice. Please see specific information pulled into the AVS if appropriate.     {ARVIND  [employer] Provider Statement:16309}

## 2025-06-23 ENCOUNTER — RESULTS FOLLOW-UP (OUTPATIENT)
Dept: FAMILY MEDICINE CLINIC | Facility: CLINIC | Age: 40
End: 2025-06-23
Payer: COMMERCIAL

## 2025-06-23 RX ORDER — LISDEXAMFETAMINE DIMESYLATE 60 MG/1
60 CAPSULE ORAL EVERY MORNING
Qty: 30 CAPSULE | Refills: 0 | Status: SHIPPED | OUTPATIENT
Start: 2025-07-18 | End: 2025-08-17

## 2025-06-23 RX ORDER — LISDEXAMFETAMINE DIMESYLATE 60 MG/1
60 CAPSULE ORAL EVERY MORNING
Qty: 30 CAPSULE | Refills: 0 | Status: SHIPPED | OUTPATIENT
Start: 2025-06-23 | End: 2025-07-23

## 2025-06-24 LAB — FENTANYL UR-MCNC: NEGATIVE NG/ML

## 2025-06-24 NOTE — TELEPHONE ENCOUNTER
Health Maintenance Due   Topic Date Due   • Varicella Vaccine (1 of 2 - 2-dose childhood series) Never done   • Pneumococcal Vaccine 0-64 (2 - PCV) 01/08/2015   • Diabetes Eye Exam  06/12/2019   • Diabetes Foot Exam  07/07/2021   • Traditional Medicare- Medicare Wellness Visit  01/26/2022   • COVID-19 Vaccine (4 - Booster for Moderna series) 03/22/2022   • Depression Screening  02/15/2023       Patient is due for the topics as listed above and wishes to proceed with them.    L[employer] message sent to patient informing of normal test results.

## 2025-07-20 DIAGNOSIS — F90.2 ATTENTION DEFICIT HYPERACTIVITY DISORDER (ADHD), COMBINED TYPE: ICD-10-CM

## 2025-07-21 RX ORDER — LISDEXAMFETAMINE DIMESYLATE 60 MG/1
60 CAPSULE ORAL EVERY MORNING
Qty: 30 CAPSULE | Refills: 0 | Status: SHIPPED | OUTPATIENT
Start: 2025-07-21 | End: 2025-08-20

## 2025-07-21 NOTE — TELEPHONE ENCOUNTER
Rx Refill Note  Requested Prescriptions     Pending Prescriptions Disp Refills    lisdexamfetamine (Vyvanse) 60 MG capsule 30 capsule 0     Sig: Take 1 capsule by mouth Every Morning for 30 days      Last office visit with prescribing clinician: 6/20/25  Last telemedicine visit with prescribing clinician: Visit date not found   Next office visit with prescribing clinician: 9/17/25    CSA up to date 6/20/25  Last UDS was 6/20/25    Yana Chaudhary MA  07/21/25, 08:32 CDT

## (undated) DEVICE — SUTURE MCRYL SZ 4-0 L18IN ABSRB UD L19MM PS-2 3/8 CIR PRIM Y496G

## (undated) DEVICE — MINOR CDS: Brand: MEDLINE INDUSTRIES, INC.

## (undated) DEVICE — ADHESIVE SKIN CLSR 0.7ML TOP DERMBND ADV

## (undated) DEVICE — TIBURON LAPAROTOMY DRAPE: Brand: CONVERTORS

## (undated) DEVICE — SUTURE VCRL SZ 3-0 L27IN ABSRB UD L26MM SH 1/2 CIR J416H

## (undated) DEVICE — SUTURE PDS + SZ 0 L27IN ABSRB VLT L36MM CT 1 1 2 CIR PDP340H

## (undated) DEVICE — SUTURE VCRL SZ 0 L54IN ABSRB UD LIGAPAK REEL NDL J287G

## (undated) DEVICE — STERILE POLYISOPRENE POWDER-FREE SURGICAL GLOVES: Brand: PROTEXIS

## (undated) DEVICE — DECANTER VI VENT W/ VLV FOR ASEP TRNSF OF FLD